# Patient Record
Sex: MALE | Race: WHITE | NOT HISPANIC OR LATINO | Employment: OTHER | URBAN - METROPOLITAN AREA
[De-identification: names, ages, dates, MRNs, and addresses within clinical notes are randomized per-mention and may not be internally consistent; named-entity substitution may affect disease eponyms.]

---

## 2023-03-21 LAB
ALANINE AMINOTRANSFERASE (SGPT) (U/L) IN SER/PLAS: 14 U/L (ref 10–52)
ALBUMIN (G/DL) IN SER/PLAS: 3.6 G/DL (ref 3.4–5)
ALKALINE PHOSPHATASE (U/L) IN SER/PLAS: 85 U/L (ref 33–136)
ANION GAP IN SER/PLAS: 12 MMOL/L (ref 10–20)
ASPARTATE AMINOTRANSFERASE (SGOT) (U/L) IN SER/PLAS: 19 U/L (ref 9–39)
BILIRUBIN TOTAL (MG/DL) IN SER/PLAS: 0.4 MG/DL (ref 0–1.2)
CALCIUM (MG/DL) IN SER/PLAS: 8.8 MG/DL (ref 8.6–10.3)
CARBON DIOXIDE, TOTAL (MMOL/L) IN SER/PLAS: 25 MMOL/L (ref 21–32)
CHLORIDE (MMOL/L) IN SER/PLAS: 104 MMOL/L (ref 98–107)
CREATININE (MG/DL) IN SER/PLAS: 1.16 MG/DL (ref 0.5–1.3)
ERYTHROCYTE DISTRIBUTION WIDTH (RATIO) BY AUTOMATED COUNT: 13.9 % (ref 11.5–14.5)
ERYTHROCYTE MEAN CORPUSCULAR HEMOGLOBIN CONCENTRATION (G/DL) BY AUTOMATED: 32.4 G/DL (ref 32–36)
ERYTHROCYTE MEAN CORPUSCULAR VOLUME (FL) BY AUTOMATED COUNT: 89 FL (ref 80–100)
ERYTHROCYTES (10*6/UL) IN BLOOD BY AUTOMATED COUNT: 4.26 X10E12/L (ref 4.5–5.9)
ESTIMATED AVERAGE GLUCOSE FOR HBA1C: 151 MG/DL
GFR MALE: 68 ML/MIN/1.73M2
GLUCOSE (MG/DL) IN SER/PLAS: 119 MG/DL (ref 74–99)
HEMATOCRIT (%) IN BLOOD BY AUTOMATED COUNT: 38 % (ref 41–52)
HEMOGLOBIN (G/DL) IN BLOOD: 12.3 G/DL (ref 13.5–17.5)
HEMOGLOBIN A1C/HEMOGLOBIN TOTAL IN BLOOD: 6.9 %
LEUKOCYTES (10*3/UL) IN BLOOD BY AUTOMATED COUNT: 5.5 X10E9/L (ref 4.4–11.3)
NRBC (PER 100 WBCS) BY AUTOMATED COUNT: 0 /100 WBC (ref 0–0)
PLATELETS (10*3/UL) IN BLOOD AUTOMATED COUNT: 203 X10E9/L (ref 150–450)
POTASSIUM (MMOL/L) IN SER/PLAS: 4.3 MMOL/L (ref 3.5–5.3)
PROTEIN TOTAL: 6.8 G/DL (ref 6.4–8.2)
SODIUM (MMOL/L) IN SER/PLAS: 137 MMOL/L (ref 136–145)
UREA NITROGEN (MG/DL) IN SER/PLAS: 24 MG/DL (ref 6–23)

## 2023-04-15 LAB
APPEARANCE, URINE: ABNORMAL
BACTERIA, URINE: ABNORMAL /HPF
BILIRUBIN, URINE: NEGATIVE
BLOOD, URINE: ABNORMAL
COLOR, URINE: YELLOW
GLUCOSE, URINE: ABNORMAL MG/DL
KETONES, URINE: NEGATIVE MG/DL
LEUKOCYTE ESTERASE, URINE: ABNORMAL
NITRITE, URINE: POSITIVE
PH, URINE: 7 (ref 5–8)
PROTEIN, URINE: ABNORMAL MG/DL
RBC, URINE: 5 /HPF (ref 0–5)
SPECIFIC GRAVITY, URINE: 1.01 (ref 1–1.03)
SQUAMOUS EPITHELIAL CELLS, URINE: 1 /HPF
UROBILINOGEN, URINE: <2 MG/DL (ref 0–1.9)
WBC CLUMPS, URINE: ABNORMAL /HPF
WBC, URINE: >182 /HPF (ref 0–5)

## 2023-04-17 LAB — URINE CULTURE: ABNORMAL

## 2023-04-27 LAB
ANION GAP IN SER/PLAS: 14 MMOL/L (ref 10–20)
BASOPHILS (10*3/UL) IN BLOOD BY AUTOMATED COUNT: 0.01 X10E9/L (ref 0–0.1)
BASOPHILS/100 LEUKOCYTES IN BLOOD BY AUTOMATED COUNT: 0.1 % (ref 0–2)
CALCIUM (MG/DL) IN SER/PLAS: 9.6 MG/DL (ref 8.6–10.3)
CARBON DIOXIDE, TOTAL (MMOL/L) IN SER/PLAS: 24 MMOL/L (ref 21–32)
CHLORIDE (MMOL/L) IN SER/PLAS: 103 MMOL/L (ref 98–107)
CREATININE (MG/DL) IN SER/PLAS: 1.29 MG/DL (ref 0.5–1.3)
EOSINOPHILS (10*3/UL) IN BLOOD BY AUTOMATED COUNT: 0 X10E9/L (ref 0–0.7)
EOSINOPHILS/100 LEUKOCYTES IN BLOOD BY AUTOMATED COUNT: 0 % (ref 0–6)
ERYTHROCYTE DISTRIBUTION WIDTH (RATIO) BY AUTOMATED COUNT: 14.5 % (ref 11.5–14.5)
ERYTHROCYTE MEAN CORPUSCULAR HEMOGLOBIN CONCENTRATION (G/DL) BY AUTOMATED: 31 G/DL (ref 32–36)
ERYTHROCYTE MEAN CORPUSCULAR VOLUME (FL) BY AUTOMATED COUNT: 89 FL (ref 80–100)
ERYTHROCYTES (10*6/UL) IN BLOOD BY AUTOMATED COUNT: 4.01 X10E12/L (ref 4.5–5.9)
GFR MALE: 60 ML/MIN/1.73M2
GLUCOSE (MG/DL) IN SER/PLAS: 174 MG/DL (ref 74–99)
HEMATOCRIT (%) IN BLOOD BY AUTOMATED COUNT: 35.8 % (ref 41–52)
HEMOGLOBIN (G/DL) IN BLOOD: 11.1 G/DL (ref 13.5–17.5)
IMMATURE GRANULOCYTES/100 LEUKOCYTES IN BLOOD BY AUTOMATED COUNT: 0.6 % (ref 0–0.9)
LEUKOCYTES (10*3/UL) IN BLOOD BY AUTOMATED COUNT: 8.3 X10E9/L (ref 4.4–11.3)
LYMPHOCYTES (10*3/UL) IN BLOOD BY AUTOMATED COUNT: 0.57 X10E9/L (ref 1.2–4.8)
LYMPHOCYTES/100 LEUKOCYTES IN BLOOD BY AUTOMATED COUNT: 6.8 % (ref 13–44)
MONOCYTES (10*3/UL) IN BLOOD BY AUTOMATED COUNT: 0.18 X10E9/L (ref 0.1–1)
MONOCYTES/100 LEUKOCYTES IN BLOOD BY AUTOMATED COUNT: 2.2 % (ref 2–10)
NEUTROPHILS (10*3/UL) IN BLOOD BY AUTOMATED COUNT: 7.52 X10E9/L (ref 1.2–7.7)
NEUTROPHILS/100 LEUKOCYTES IN BLOOD BY AUTOMATED COUNT: 90.3 % (ref 40–80)
NRBC (PER 100 WBCS) BY AUTOMATED COUNT: 0 /100 WBC (ref 0–0)
PLATELETS (10*3/UL) IN BLOOD AUTOMATED COUNT: 227 X10E9/L (ref 150–450)
POTASSIUM (MMOL/L) IN SER/PLAS: 4.9 MMOL/L (ref 3.5–5.3)
SODIUM (MMOL/L) IN SER/PLAS: 136 MMOL/L (ref 136–145)
UREA NITROGEN (MG/DL) IN SER/PLAS: 30 MG/DL (ref 6–23)

## 2023-05-03 LAB
ANION GAP IN SER/PLAS: 11 MMOL/L (ref 10–20)
APPEARANCE, URINE: CLEAR
BILIRUBIN, URINE: NEGATIVE
BLOOD, URINE: ABNORMAL
CALCIUM (MG/DL) IN SER/PLAS: 9.4 MG/DL (ref 8.6–10.3)
CARBON DIOXIDE, TOTAL (MMOL/L) IN SER/PLAS: 28 MMOL/L (ref 21–32)
CHLORIDE (MMOL/L) IN SER/PLAS: 106 MMOL/L (ref 98–107)
COLOR, URINE: YELLOW
CREATININE (MG/DL) IN SER/PLAS: 0.79 MG/DL (ref 0.5–1.3)
ERYTHROCYTE DISTRIBUTION WIDTH (RATIO) BY AUTOMATED COUNT: 13.8 % (ref 11.5–14.5)
ERYTHROCYTE MEAN CORPUSCULAR HEMOGLOBIN CONCENTRATION (G/DL) BY AUTOMATED: 31.7 G/DL (ref 32–36)
ERYTHROCYTE MEAN CORPUSCULAR VOLUME (FL) BY AUTOMATED COUNT: 89 FL (ref 80–100)
ERYTHROCYTES (10*6/UL) IN BLOOD BY AUTOMATED COUNT: 3.9 X10E12/L (ref 4.5–5.9)
GFR MALE: >90 ML/MIN/1.73M2
GLUCOSE (MG/DL) IN SER/PLAS: 48 MG/DL (ref 74–99)
GLUCOSE, URINE: NEGATIVE MG/DL
HEMATOCRIT (%) IN BLOOD BY AUTOMATED COUNT: 34.7 % (ref 41–52)
HEMOGLOBIN (G/DL) IN BLOOD: 11 G/DL (ref 13.5–17.5)
KETONES, URINE: ABNORMAL MG/DL
LEUKOCYTE ESTERASE, URINE: NEGATIVE
LEUKOCYTES (10*3/UL) IN BLOOD BY AUTOMATED COUNT: 4.3 X10E9/L (ref 4.4–11.3)
NITRITE, URINE: NEGATIVE
NRBC (PER 100 WBCS) BY AUTOMATED COUNT: 0 /100 WBC (ref 0–0)
PH, URINE: 6 (ref 5–8)
PLATELETS (10*3/UL) IN BLOOD AUTOMATED COUNT: 241 X10E9/L (ref 150–450)
POTASSIUM (MMOL/L) IN SER/PLAS: 4.2 MMOL/L (ref 3.5–5.3)
PROTEIN, URINE: ABNORMAL MG/DL
RBC, URINE: 2 /HPF (ref 0–5)
SODIUM (MMOL/L) IN SER/PLAS: 141 MMOL/L (ref 136–145)
SPECIFIC GRAVITY, URINE: 1.01 (ref 1–1.03)
SQUAMOUS EPITHELIAL CELLS, URINE: <1 /HPF
UREA NITROGEN (MG/DL) IN SER/PLAS: 20 MG/DL (ref 6–23)
UROBILINOGEN, URINE: 2 MG/DL (ref 0–1.9)
WBC, URINE: <1 /HPF (ref 0–5)

## 2023-05-04 LAB — URINE CULTURE: NORMAL

## 2023-06-16 LAB
ANION GAP IN SER/PLAS: 10 MMOL/L (ref 10–20)
CALCIUM (MG/DL) IN SER/PLAS: 8.7 MG/DL (ref 8.6–10.3)
CARBON DIOXIDE, TOTAL (MMOL/L) IN SER/PLAS: 26 MMOL/L (ref 21–32)
CHLORIDE (MMOL/L) IN SER/PLAS: 106 MMOL/L (ref 98–107)
CREATININE (MG/DL) IN SER/PLAS: 0.86 MG/DL (ref 0.5–1.3)
GFR MALE: >90 ML/MIN/1.73M2
GLUCOSE (MG/DL) IN SER/PLAS: 146 MG/DL (ref 74–99)
POTASSIUM (MMOL/L) IN SER/PLAS: 4.4 MMOL/L (ref 3.5–5.3)
SODIUM (MMOL/L) IN SER/PLAS: 138 MMOL/L (ref 136–145)
UREA NITROGEN (MG/DL) IN SER/PLAS: 15 MG/DL (ref 6–23)

## 2023-06-20 LAB — PREALBUMIN (MG/DL) IN SERUM OR PLASMA: 17.6 MG/DL (ref 18–40)

## 2023-08-28 LAB
ESTIMATED AVERAGE GLUCOSE FOR HBA1C: 151 MG/DL
HEMOGLOBIN A1C/HEMOGLOBIN TOTAL IN BLOOD: 6.9 %

## 2023-10-16 ENCOUNTER — LAB REQUISITION (OUTPATIENT)
Dept: LAB | Facility: HOSPITAL | Age: 68
End: 2023-10-16
Payer: MEDICARE

## 2023-10-16 DIAGNOSIS — I10 ESSENTIAL (PRIMARY) HYPERTENSION: ICD-10-CM

## 2023-10-16 DIAGNOSIS — E11.9 TYPE 2 DIABETES MELLITUS WITHOUT COMPLICATIONS (MULTI): ICD-10-CM

## 2023-10-16 LAB
ALBUMIN SERPL BCP-MCNC: 3.5 G/DL (ref 3.4–5)
ALP SERPL-CCNC: 63 U/L (ref 33–136)
ALT SERPL W P-5'-P-CCNC: 11 U/L (ref 10–52)
ANION GAP SERPL CALC-SCNC: 10 MMOL/L (ref 10–20)
AST SERPL W P-5'-P-CCNC: 15 U/L (ref 9–39)
BILIRUB SERPL-MCNC: 0.3 MG/DL (ref 0–1.2)
BUN SERPL-MCNC: 22 MG/DL (ref 6–23)
CALCIUM SERPL-MCNC: 9 MG/DL (ref 8.6–10.3)
CHLORIDE SERPL-SCNC: 105 MMOL/L (ref 98–107)
CHOLEST SERPL-MCNC: 138 MG/DL (ref 0–199)
CHOLESTEROL/HDL RATIO: 3.6
CO2 SERPL-SCNC: 28 MMOL/L (ref 21–32)
CREAT SERPL-MCNC: 1.21 MG/DL (ref 0.5–1.3)
ERYTHROCYTE [DISTWIDTH] IN BLOOD BY AUTOMATED COUNT: 14.1 % (ref 11.5–14.5)
EST. AVERAGE GLUCOSE BLD GHB EST-MCNC: 151 MG/DL
GFR SERPL CREATININE-BSD FRML MDRD: 65 ML/MIN/1.73M*2
GLUCOSE SERPL-MCNC: 107 MG/DL (ref 74–99)
HBA1C MFR BLD: 6.9 %
HCT VFR BLD AUTO: 36.1 % (ref 41–52)
HDLC SERPL-MCNC: 38.6 MG/DL
HGB BLD-MCNC: 11.4 G/DL (ref 13.5–17.5)
HOLD SPECIMEN: NORMAL
LDLC SERPL CALC-MCNC: 73 MG/DL
MCH RBC QN AUTO: 28.7 PG (ref 26–34)
MCHC RBC AUTO-ENTMCNC: 31.6 G/DL (ref 32–36)
MCV RBC AUTO: 91 FL (ref 80–100)
NON HDL CHOLESTEROL: 99 MG/DL (ref 0–149)
NRBC BLD-RTO: 0 /100 WBCS (ref 0–0)
PLATELET # BLD AUTO: 177 X10*3/UL (ref 150–450)
PMV BLD AUTO: 10.1 FL (ref 7.5–11.5)
POTASSIUM SERPL-SCNC: 4.4 MMOL/L (ref 3.5–5.3)
PROT SERPL-MCNC: 6.2 G/DL (ref 6.4–8.2)
RBC # BLD AUTO: 3.97 X10*6/UL (ref 4.5–5.9)
SODIUM SERPL-SCNC: 139 MMOL/L (ref 136–145)
TRIGL SERPL-MCNC: 132 MG/DL (ref 0–149)
VLDL: 26 MG/DL (ref 0–40)
WBC # BLD AUTO: 5.2 X10*3/UL (ref 4.4–11.3)

## 2023-10-16 PROCEDURE — 80061 LIPID PANEL: CPT | Mod: OUT | Performed by: INTERNAL MEDICINE

## 2023-10-16 PROCEDURE — 80053 COMPREHEN METABOLIC PANEL: CPT | Mod: OUT | Performed by: INTERNAL MEDICINE

## 2023-10-16 PROCEDURE — 85027 COMPLETE CBC AUTOMATED: CPT | Mod: OUT | Performed by: INTERNAL MEDICINE

## 2023-10-16 PROCEDURE — 83036 HEMOGLOBIN GLYCOSYLATED A1C: CPT | Mod: OUT | Performed by: INTERNAL MEDICINE

## 2023-11-15 ENCOUNTER — LAB REQUISITION (OUTPATIENT)
Dept: LAB | Facility: HOSPITAL | Age: 68
End: 2023-11-15
Payer: MEDICARE

## 2023-11-15 DIAGNOSIS — I11.0 HYPERTENSIVE HEART DISEASE WITH HEART FAILURE (MULTI): ICD-10-CM

## 2023-11-15 LAB
ANION GAP SERPL CALC-SCNC: 10 MMOL/L (ref 10–20)
BUN SERPL-MCNC: 20 MG/DL (ref 6–23)
CALCIUM SERPL-MCNC: 8.9 MG/DL (ref 8.6–10.3)
CHLORIDE SERPL-SCNC: 107 MMOL/L (ref 98–107)
CO2 SERPL-SCNC: 27 MMOL/L (ref 21–32)
CREAT SERPL-MCNC: 1.18 MG/DL (ref 0.5–1.3)
ERYTHROCYTE [DISTWIDTH] IN BLOOD BY AUTOMATED COUNT: 13.5 % (ref 11.5–14.5)
GFR SERPL CREATININE-BSD FRML MDRD: 67 ML/MIN/1.73M*2
GLUCOSE SERPL-MCNC: 157 MG/DL (ref 74–99)
HCT VFR BLD AUTO: 36.2 % (ref 41–52)
HGB BLD-MCNC: 11.4 G/DL (ref 13.5–17.5)
MCH RBC QN AUTO: 28.6 PG (ref 26–34)
MCHC RBC AUTO-ENTMCNC: 31.5 G/DL (ref 32–36)
MCV RBC AUTO: 91 FL (ref 80–100)
NRBC BLD-RTO: 0 /100 WBCS (ref 0–0)
PLATELET # BLD AUTO: 196 X10*3/UL (ref 150–450)
POTASSIUM SERPL-SCNC: 4.4 MMOL/L (ref 3.5–5.3)
RBC # BLD AUTO: 3.98 X10*6/UL (ref 4.5–5.9)
SODIUM SERPL-SCNC: 140 MMOL/L (ref 136–145)
WBC # BLD AUTO: 5.4 X10*3/UL (ref 4.4–11.3)

## 2023-11-15 PROCEDURE — 80048 BASIC METABOLIC PNL TOTAL CA: CPT | Mod: OUT | Performed by: INTERNAL MEDICINE

## 2023-11-15 PROCEDURE — 85027 COMPLETE CBC AUTOMATED: CPT | Mod: OUT | Performed by: INTERNAL MEDICINE

## 2023-12-04 ENCOUNTER — LAB REQUISITION (OUTPATIENT)
Dept: LAB | Facility: HOSPITAL | Age: 68
End: 2023-12-04
Payer: MEDICARE

## 2023-12-04 DIAGNOSIS — R19.5 OTHER FECAL ABNORMALITIES: ICD-10-CM

## 2023-12-04 DIAGNOSIS — I50.9 HEART FAILURE, UNSPECIFIED (MULTI): ICD-10-CM

## 2023-12-04 PROCEDURE — 87493 C DIFF AMPLIFIED PROBE: CPT | Mod: OUT,PARLAB | Performed by: INTERNAL MEDICINE

## 2023-12-05 LAB — C DIF TOX TCDA+TCDB STL QL NAA+PROBE: NOT DETECTED

## 2023-12-06 ENCOUNTER — LAB REQUISITION (OUTPATIENT)
Dept: LAB | Facility: HOSPITAL | Age: 68
End: 2023-12-06
Payer: MEDICARE

## 2023-12-06 DIAGNOSIS — I50.20 UNSPECIFIED SYSTOLIC (CONGESTIVE) HEART FAILURE (MULTI): ICD-10-CM

## 2023-12-06 DIAGNOSIS — J96.20 ACUTE AND CHRONIC RESPIRATORY FAILURE, UNSPECIFIED WHETHER WITH HYPOXIA OR HYPERCAPNIA (MULTI): ICD-10-CM

## 2023-12-06 DIAGNOSIS — E11.9 TYPE 2 DIABETES MELLITUS WITHOUT COMPLICATIONS (MULTI): ICD-10-CM

## 2023-12-06 LAB
ANION GAP SERPL CALC-SCNC: 10 MMOL/L (ref 10–20)
BASOPHILS # BLD AUTO: 0.01 X10*3/UL (ref 0–0.1)
BASOPHILS NFR BLD AUTO: 0.2 %
BUN SERPL-MCNC: 18 MG/DL (ref 6–23)
CALCIUM SERPL-MCNC: 8.9 MG/DL (ref 8.6–10.3)
CHLORIDE SERPL-SCNC: 108 MMOL/L (ref 98–107)
CO2 SERPL-SCNC: 26 MMOL/L (ref 21–32)
CREAT SERPL-MCNC: 1.11 MG/DL (ref 0.5–1.3)
EOSINOPHIL # BLD AUTO: 0.18 X10*3/UL (ref 0–0.7)
EOSINOPHIL NFR BLD AUTO: 4.5 %
ERYTHROCYTE [DISTWIDTH] IN BLOOD BY AUTOMATED COUNT: 13.6 % (ref 11.5–14.5)
GFR SERPL CREATININE-BSD FRML MDRD: 72 ML/MIN/1.73M*2
GLUCOSE SERPL-MCNC: 172 MG/DL (ref 74–99)
HCT VFR BLD AUTO: 35.1 % (ref 41–52)
HGB BLD-MCNC: 11.1 G/DL (ref 13.5–17.5)
IMM GRANULOCYTES # BLD AUTO: 0.01 X10*3/UL (ref 0–0.7)
IMM GRANULOCYTES NFR BLD AUTO: 0.2 % (ref 0–0.9)
LYMPHOCYTES # BLD AUTO: 1.38 X10*3/UL (ref 1.2–4.8)
LYMPHOCYTES NFR BLD AUTO: 34.4 %
MCH RBC QN AUTO: 28.8 PG (ref 26–34)
MCHC RBC AUTO-ENTMCNC: 31.6 G/DL (ref 32–36)
MCV RBC AUTO: 91 FL (ref 80–100)
MONOCYTES # BLD AUTO: 0.5 X10*3/UL (ref 0.1–1)
MONOCYTES NFR BLD AUTO: 12.5 %
NEUTROPHILS # BLD AUTO: 1.93 X10*3/UL (ref 1.2–7.7)
NEUTROPHILS NFR BLD AUTO: 48.2 %
NRBC BLD-RTO: 0 /100 WBCS (ref 0–0)
PLATELET # BLD AUTO: 163 X10*3/UL (ref 150–450)
POTASSIUM SERPL-SCNC: 3.9 MMOL/L (ref 3.5–5.3)
RBC # BLD AUTO: 3.86 X10*6/UL (ref 4.5–5.9)
SODIUM SERPL-SCNC: 140 MMOL/L (ref 136–145)
WBC # BLD AUTO: 4 X10*3/UL (ref 4.4–11.3)

## 2023-12-06 PROCEDURE — 80048 BASIC METABOLIC PNL TOTAL CA: CPT | Mod: OUT | Performed by: INTERNAL MEDICINE

## 2023-12-06 PROCEDURE — 85025 COMPLETE CBC W/AUTO DIFF WBC: CPT | Mod: OUT | Performed by: INTERNAL MEDICINE

## 2023-12-20 ENCOUNTER — LAB REQUISITION (OUTPATIENT)
Dept: LAB | Facility: HOSPITAL | Age: 68
End: 2023-12-20
Payer: MEDICARE

## 2023-12-20 DIAGNOSIS — I10 ESSENTIAL (PRIMARY) HYPERTENSION: ICD-10-CM

## 2023-12-20 DIAGNOSIS — E11.9 TYPE 2 DIABETES MELLITUS WITHOUT COMPLICATIONS (MULTI): ICD-10-CM

## 2023-12-20 LAB
ANION GAP SERPL CALC-SCNC: 11 MMOL/L (ref 10–20)
BUN SERPL-MCNC: 21 MG/DL (ref 6–23)
CALCIUM SERPL-MCNC: 8.8 MG/DL (ref 8.6–10.3)
CHLORIDE SERPL-SCNC: 105 MMOL/L (ref 98–107)
CO2 SERPL-SCNC: 27 MMOL/L (ref 21–32)
CREAT SERPL-MCNC: 1.3 MG/DL (ref 0.5–1.3)
GFR SERPL CREATININE-BSD FRML MDRD: 59 ML/MIN/1.73M*2
GLUCOSE SERPL-MCNC: 161 MG/DL (ref 74–99)
POTASSIUM SERPL-SCNC: 4.9 MMOL/L (ref 3.5–5.3)
SODIUM SERPL-SCNC: 138 MMOL/L (ref 136–145)

## 2023-12-20 PROCEDURE — 80048 BASIC METABOLIC PNL TOTAL CA: CPT | Mod: OUT | Performed by: INTERNAL MEDICINE

## 2024-03-14 ENCOUNTER — LAB REQUISITION (OUTPATIENT)
Dept: LAB | Facility: HOSPITAL | Age: 69
End: 2024-03-14
Payer: MEDICARE

## 2024-03-14 DIAGNOSIS — I50.20 UNSPECIFIED SYSTOLIC (CONGESTIVE) HEART FAILURE (MULTI): ICD-10-CM

## 2024-03-14 DIAGNOSIS — I10 ESSENTIAL (PRIMARY) HYPERTENSION: ICD-10-CM

## 2024-03-14 DIAGNOSIS — E11.9 TYPE 2 DIABETES MELLITUS WITHOUT COMPLICATIONS (MULTI): ICD-10-CM

## 2024-03-14 LAB
ALBUMIN SERPL BCP-MCNC: 4.1 G/DL (ref 3.4–5)
ALP SERPL-CCNC: 90 U/L (ref 33–136)
ALT SERPL W P-5'-P-CCNC: 13 U/L (ref 10–52)
ANION GAP SERPL CALC-SCNC: 14 MMOL/L (ref 10–20)
AST SERPL W P-5'-P-CCNC: 15 U/L (ref 9–39)
BILIRUB SERPL-MCNC: 0.4 MG/DL (ref 0–1.2)
BUN SERPL-MCNC: 39 MG/DL (ref 6–23)
CALCIUM SERPL-MCNC: 9.5 MG/DL (ref 8.6–10.3)
CHLORIDE SERPL-SCNC: 103 MMOL/L (ref 98–107)
CO2 SERPL-SCNC: 25 MMOL/L (ref 21–32)
CREAT SERPL-MCNC: 2.61 MG/DL (ref 0.5–1.3)
EGFRCR SERPLBLD CKD-EPI 2021: 26 ML/MIN/1.73M*2
ERYTHROCYTE [DISTWIDTH] IN BLOOD BY AUTOMATED COUNT: 13.5 % (ref 11.5–14.5)
EST. AVERAGE GLUCOSE BLD GHB EST-MCNC: 189 MG/DL
GLUCOSE SERPL-MCNC: 247 MG/DL (ref 74–99)
HBA1C MFR BLD: 8.2 %
HCT VFR BLD AUTO: 40.5 % (ref 41–52)
HGB BLD-MCNC: 12.4 G/DL (ref 13.5–17.5)
MAGNESIUM SERPL-MCNC: 2.32 MG/DL (ref 1.6–2.4)
MCH RBC QN AUTO: 28.6 PG (ref 26–34)
MCHC RBC AUTO-ENTMCNC: 30.6 G/DL (ref 32–36)
MCV RBC AUTO: 93 FL (ref 80–100)
NRBC BLD-RTO: 0 /100 WBCS (ref 0–0)
PLATELET # BLD AUTO: 217 X10*3/UL (ref 150–450)
POTASSIUM SERPL-SCNC: 5.4 MMOL/L (ref 3.5–5.3)
PROT SERPL-MCNC: 7.3 G/DL (ref 6.4–8.2)
RBC # BLD AUTO: 4.34 X10*6/UL (ref 4.5–5.9)
SODIUM SERPL-SCNC: 137 MMOL/L (ref 136–145)
WBC # BLD AUTO: 6.4 X10*3/UL (ref 4.4–11.3)

## 2024-03-14 PROCEDURE — 80053 COMPREHEN METABOLIC PANEL: CPT | Mod: OUT | Performed by: INTERNAL MEDICINE

## 2024-03-14 PROCEDURE — 85027 COMPLETE CBC AUTOMATED: CPT | Mod: OUT | Performed by: INTERNAL MEDICINE

## 2024-03-14 PROCEDURE — 83735 ASSAY OF MAGNESIUM: CPT | Mod: OUT | Performed by: INTERNAL MEDICINE

## 2024-03-14 PROCEDURE — 83036 HEMOGLOBIN GLYCOSYLATED A1C: CPT | Mod: OUT | Performed by: INTERNAL MEDICINE

## 2024-03-24 ENCOUNTER — LAB REQUISITION (OUTPATIENT)
Dept: LAB | Facility: HOSPITAL | Age: 69
End: 2024-03-24
Payer: MEDICARE

## 2024-03-24 DIAGNOSIS — R41.0 DISORIENTATION, UNSPECIFIED: ICD-10-CM

## 2024-03-24 LAB
ANION GAP SERPL CALC-SCNC: 11 MMOL/L (ref 10–20)
APPEARANCE UR: CLEAR
BILIRUB UR STRIP.AUTO-MCNC: NEGATIVE MG/DL
BUN SERPL-MCNC: 24 MG/DL (ref 6–23)
CALCIUM SERPL-MCNC: 9.1 MG/DL (ref 8.6–10.3)
CHLORIDE SERPL-SCNC: 107 MMOL/L (ref 98–107)
CO2 SERPL-SCNC: 26 MMOL/L (ref 21–32)
COLOR UR: YELLOW
CREAT SERPL-MCNC: 1.03 MG/DL (ref 0.5–1.3)
EGFRCR SERPLBLD CKD-EPI 2021: 79 ML/MIN/1.73M*2
ERYTHROCYTE [DISTWIDTH] IN BLOOD BY AUTOMATED COUNT: 13.2 % (ref 11.5–14.5)
GLUCOSE SERPL-MCNC: 139 MG/DL (ref 74–99)
GLUCOSE UR STRIP.AUTO-MCNC: ABNORMAL MG/DL
HCT VFR BLD AUTO: 38.6 % (ref 41–52)
HGB BLD-MCNC: 12.5 G/DL (ref 13.5–17.5)
KETONES UR STRIP.AUTO-MCNC: NEGATIVE MG/DL
LEUKOCYTE ESTERASE UR QL STRIP.AUTO: NEGATIVE
MCH RBC QN AUTO: 29.3 PG (ref 26–34)
MCHC RBC AUTO-ENTMCNC: 32.4 G/DL (ref 32–36)
MCV RBC AUTO: 90 FL (ref 80–100)
NITRITE UR QL STRIP.AUTO: NEGATIVE
NRBC BLD-RTO: 0 /100 WBCS (ref 0–0)
PH UR STRIP.AUTO: 5 [PH]
PLATELET # BLD AUTO: 170 X10*3/UL (ref 150–450)
POTASSIUM SERPL-SCNC: 4.7 MMOL/L (ref 3.5–5.3)
PROT UR STRIP.AUTO-MCNC: NEGATIVE MG/DL
RBC # BLD AUTO: 4.27 X10*6/UL (ref 4.5–5.9)
RBC # UR STRIP.AUTO: NEGATIVE /UL
SODIUM SERPL-SCNC: 139 MMOL/L (ref 136–145)
SP GR UR STRIP.AUTO: 1.02
UROBILINOGEN UR STRIP.AUTO-MCNC: 2 MG/DL
WBC # BLD AUTO: 4.2 X10*3/UL (ref 4.4–11.3)

## 2024-03-24 PROCEDURE — 81003 URINALYSIS AUTO W/O SCOPE: CPT | Mod: OUT | Performed by: INTERNAL MEDICINE

## 2024-03-24 PROCEDURE — 85027 COMPLETE CBC AUTOMATED: CPT | Mod: OUT | Performed by: INTERNAL MEDICINE

## 2024-03-24 PROCEDURE — 87086 URINE CULTURE/COLONY COUNT: CPT | Mod: OUT,PARLAB | Performed by: INTERNAL MEDICINE

## 2024-03-24 PROCEDURE — 80048 BASIC METABOLIC PNL TOTAL CA: CPT | Mod: OUT | Performed by: INTERNAL MEDICINE

## 2024-03-26 LAB — BACTERIA UR CULT: ABNORMAL

## 2024-04-01 ENCOUNTER — LAB REQUISITION (OUTPATIENT)
Dept: LAB | Facility: HOSPITAL | Age: 69
End: 2024-04-01
Payer: MEDICARE

## 2024-04-01 DIAGNOSIS — I11.0 HYPERTENSIVE HEART DISEASE WITH HEART FAILURE (MULTI): ICD-10-CM

## 2024-04-01 LAB
BASOPHILS # BLD AUTO: 0.02 X10*3/UL (ref 0–0.1)
BASOPHILS NFR BLD AUTO: 0.4 %
EOSINOPHIL # BLD AUTO: 0.07 X10*3/UL (ref 0–0.7)
EOSINOPHIL NFR BLD AUTO: 1.3 %
ERYTHROCYTE [DISTWIDTH] IN BLOOD BY AUTOMATED COUNT: 13.5 % (ref 11.5–14.5)
HCT VFR BLD AUTO: 37.6 % (ref 41–52)
HGB BLD-MCNC: 11.6 G/DL (ref 13.5–17.5)
IMM GRANULOCYTES # BLD AUTO: 0.01 X10*3/UL (ref 0–0.7)
IMM GRANULOCYTES NFR BLD AUTO: 0.2 % (ref 0–0.9)
LYMPHOCYTES # BLD AUTO: 1.54 X10*3/UL (ref 1.2–4.8)
LYMPHOCYTES NFR BLD AUTO: 29.3 %
MCH RBC QN AUTO: 28.7 PG (ref 26–34)
MCHC RBC AUTO-ENTMCNC: 30.9 G/DL (ref 32–36)
MCV RBC AUTO: 93 FL (ref 80–100)
MONOCYTES # BLD AUTO: 0.44 X10*3/UL (ref 0.1–1)
MONOCYTES NFR BLD AUTO: 8.4 %
NEUTROPHILS # BLD AUTO: 3.18 X10*3/UL (ref 1.2–7.7)
NEUTROPHILS NFR BLD AUTO: 60.4 %
NRBC BLD-RTO: 0 /100 WBCS (ref 0–0)
PLATELET # BLD AUTO: 202 X10*3/UL (ref 150–450)
RBC # BLD AUTO: 4.04 X10*6/UL (ref 4.5–5.9)
WBC # BLD AUTO: 5.3 X10*3/UL (ref 4.4–11.3)

## 2024-04-01 PROCEDURE — 85025 COMPLETE CBC W/AUTO DIFF WBC: CPT | Mod: OUT | Performed by: INTERNAL MEDICINE

## 2024-04-02 ENCOUNTER — LAB REQUISITION (OUTPATIENT)
Dept: LAB | Facility: HOSPITAL | Age: 69
End: 2024-04-02
Payer: MEDICARE

## 2024-04-02 DIAGNOSIS — E11.9 TYPE 2 DIABETES MELLITUS WITHOUT COMPLICATIONS (MULTI): ICD-10-CM

## 2024-04-02 DIAGNOSIS — D64.9 ANEMIA, UNSPECIFIED: ICD-10-CM

## 2024-04-02 LAB
ERYTHROCYTE [DISTWIDTH] IN BLOOD BY AUTOMATED COUNT: 13.5 % (ref 11.5–14.5)
HCT VFR BLD AUTO: 38.4 % (ref 41–52)
HGB BLD-MCNC: 12 G/DL (ref 13.5–17.5)
MCH RBC QN AUTO: 29.3 PG (ref 26–34)
MCHC RBC AUTO-ENTMCNC: 31.3 G/DL (ref 32–36)
MCV RBC AUTO: 94 FL (ref 80–100)
NRBC BLD-RTO: 0 /100 WBCS (ref 0–0)
PLATELET # BLD AUTO: 160 X10*3/UL (ref 150–450)
RBC # BLD AUTO: 4.1 X10*6/UL (ref 4.5–5.9)
WBC # BLD AUTO: 4.4 X10*3/UL (ref 4.4–11.3)

## 2024-04-02 PROCEDURE — 85027 COMPLETE CBC AUTOMATED: CPT | Mod: OUT | Performed by: INTERNAL MEDICINE

## 2024-05-23 ENCOUNTER — LAB REQUISITION (OUTPATIENT)
Dept: LAB | Facility: HOSPITAL | Age: 69
End: 2024-05-23
Payer: MEDICARE

## 2024-05-23 DIAGNOSIS — D64.9 ANEMIA, UNSPECIFIED: ICD-10-CM

## 2024-05-23 DIAGNOSIS — E11.9 TYPE 2 DIABETES MELLITUS WITHOUT COMPLICATIONS (MULTI): ICD-10-CM

## 2024-05-23 LAB
ANION GAP SERPL CALC-SCNC: 14 MMOL/L (ref 10–20)
BUN SERPL-MCNC: 21 MG/DL (ref 6–23)
CALCIUM SERPL-MCNC: 9.6 MG/DL (ref 8.6–10.3)
CHLORIDE SERPL-SCNC: 104 MMOL/L (ref 98–107)
CO2 SERPL-SCNC: 25 MMOL/L (ref 21–32)
CREAT SERPL-MCNC: 1.01 MG/DL (ref 0.5–1.3)
EGFRCR SERPLBLD CKD-EPI 2021: 81 ML/MIN/1.73M*2
ERYTHROCYTE [DISTWIDTH] IN BLOOD BY AUTOMATED COUNT: 13.2 % (ref 11.5–14.5)
GLUCOSE SERPL-MCNC: 122 MG/DL (ref 74–99)
HCT VFR BLD AUTO: 41.7 % (ref 41–52)
HGB BLD-MCNC: 13.7 G/DL (ref 13.5–17.5)
MCH RBC QN AUTO: 29.5 PG (ref 26–34)
MCHC RBC AUTO-ENTMCNC: 32.9 G/DL (ref 32–36)
MCV RBC AUTO: 90 FL (ref 80–100)
NRBC BLD-RTO: 0 /100 WBCS (ref 0–0)
PLATELET # BLD AUTO: 226 X10*3/UL (ref 150–450)
POTASSIUM SERPL-SCNC: 4.5 MMOL/L (ref 3.5–5.3)
RBC # BLD AUTO: 4.64 X10*6/UL (ref 4.5–5.9)
SODIUM SERPL-SCNC: 138 MMOL/L (ref 136–145)
WBC # BLD AUTO: 5.8 X10*3/UL (ref 4.4–11.3)

## 2024-05-23 PROCEDURE — 85027 COMPLETE CBC AUTOMATED: CPT | Mod: OUT | Performed by: INTERNAL MEDICINE

## 2024-05-23 PROCEDURE — 82374 ASSAY BLOOD CARBON DIOXIDE: CPT | Mod: OUT | Performed by: INTERNAL MEDICINE

## 2024-06-06 ENCOUNTER — LAB REQUISITION (OUTPATIENT)
Dept: LAB | Facility: HOSPITAL | Age: 69
End: 2024-06-06
Payer: MEDICARE

## 2024-06-06 DIAGNOSIS — R11.10 VOMITING, UNSPECIFIED: ICD-10-CM

## 2024-06-06 LAB
ANION GAP SERPL CALC-SCNC: 20 MMOL/L (ref 10–20)
BUN SERPL-MCNC: 29 MG/DL (ref 6–23)
CALCIUM SERPL-MCNC: 8.9 MG/DL (ref 8.6–10.3)
CHLORIDE SERPL-SCNC: 98 MMOL/L (ref 98–107)
CO2 SERPL-SCNC: 20 MMOL/L (ref 21–32)
CREAT SERPL-MCNC: 1.7 MG/DL (ref 0.5–1.3)
EGFRCR SERPLBLD CKD-EPI 2021: 43 ML/MIN/1.73M*2
ERYTHROCYTE [DISTWIDTH] IN BLOOD BY AUTOMATED COUNT: 13.3 % (ref 11.5–14.5)
GLUCOSE SERPL-MCNC: 318 MG/DL (ref 74–99)
HCT VFR BLD AUTO: 41.9 % (ref 41–52)
HGB BLD-MCNC: 13.6 G/DL (ref 13.5–17.5)
MCH RBC QN AUTO: 29.8 PG (ref 26–34)
MCHC RBC AUTO-ENTMCNC: 32.5 G/DL (ref 32–36)
MCV RBC AUTO: 92 FL (ref 80–100)
NRBC BLD-RTO: 0 /100 WBCS (ref 0–0)
PLATELET # BLD AUTO: 151 X10*3/UL (ref 150–450)
POTASSIUM SERPL-SCNC: 5.6 MMOL/L (ref 3.5–5.3)
RBC # BLD AUTO: 4.56 X10*6/UL (ref 4.5–5.9)
SODIUM SERPL-SCNC: 132 MMOL/L (ref 136–145)
WBC # BLD AUTO: 2.6 X10*3/UL (ref 4.4–11.3)

## 2024-06-06 PROCEDURE — 82374 ASSAY BLOOD CARBON DIOXIDE: CPT | Mod: OUT | Performed by: INTERNAL MEDICINE

## 2024-06-06 PROCEDURE — 85027 COMPLETE CBC AUTOMATED: CPT | Mod: OUT | Performed by: INTERNAL MEDICINE

## 2024-06-06 PROCEDURE — 80048 BASIC METABOLIC PNL TOTAL CA: CPT | Mod: OUT | Performed by: INTERNAL MEDICINE

## 2024-06-08 ENCOUNTER — LAB REQUISITION (OUTPATIENT)
Dept: LAB | Facility: HOSPITAL | Age: 69
End: 2024-06-08
Payer: MEDICARE

## 2024-06-08 DIAGNOSIS — R94.4 ABNORMAL RESULTS OF KIDNEY FUNCTION STUDIES: ICD-10-CM

## 2024-06-08 DIAGNOSIS — D72.819 DECREASED WHITE BLOOD CELL COUNT, UNSPECIFIED: ICD-10-CM

## 2024-06-08 LAB
ANION GAP SERPL CALC-SCNC: 13 MMOL/L (ref 10–20)
BASOPHILS # BLD AUTO: 0.01 X10*3/UL (ref 0–0.1)
BASOPHILS NFR BLD AUTO: 0.1 %
BUN SERPL-MCNC: 45 MG/DL (ref 6–23)
CALCIUM SERPL-MCNC: 9.1 MG/DL (ref 8.6–10.3)
CHLORIDE SERPL-SCNC: 97 MMOL/L (ref 98–107)
CO2 SERPL-SCNC: 27 MMOL/L (ref 21–32)
CREAT SERPL-MCNC: 2.57 MG/DL (ref 0.5–1.3)
EGFRCR SERPLBLD CKD-EPI 2021: 26 ML/MIN/1.73M*2
EOSINOPHIL # BLD AUTO: 0.04 X10*3/UL (ref 0–0.7)
EOSINOPHIL NFR BLD AUTO: 0.5 %
ERYTHROCYTE [DISTWIDTH] IN BLOOD BY AUTOMATED COUNT: 13.4 % (ref 11.5–14.5)
GLUCOSE SERPL-MCNC: 201 MG/DL (ref 74–99)
HCT VFR BLD AUTO: 35.9 % (ref 41–52)
HGB BLD-MCNC: 11.9 G/DL (ref 13.5–17.5)
IMM GRANULOCYTES # BLD AUTO: 0.02 X10*3/UL (ref 0–0.7)
IMM GRANULOCYTES NFR BLD AUTO: 0.3 % (ref 0–0.9)
LYMPHOCYTES # BLD AUTO: 0.38 X10*3/UL (ref 1.2–4.8)
LYMPHOCYTES NFR BLD AUTO: 5.1 %
MCH RBC QN AUTO: 29.8 PG (ref 26–34)
MCHC RBC AUTO-ENTMCNC: 33.1 G/DL (ref 32–36)
MCV RBC AUTO: 90 FL (ref 80–100)
MONOCYTES # BLD AUTO: 0.4 X10*3/UL (ref 0.1–1)
MONOCYTES NFR BLD AUTO: 5.4 %
NEUTROPHILS # BLD AUTO: 6.54 X10*3/UL (ref 1.2–7.7)
NEUTROPHILS NFR BLD AUTO: 88.6 %
NRBC BLD-RTO: 0 /100 WBCS (ref 0–0)
PLATELET # BLD AUTO: 155 X10*3/UL (ref 150–450)
POTASSIUM SERPL-SCNC: 3.9 MMOL/L (ref 3.5–5.3)
RBC # BLD AUTO: 3.99 X10*6/UL (ref 4.5–5.9)
SODIUM SERPL-SCNC: 133 MMOL/L (ref 136–145)
WBC # BLD AUTO: 7.4 X10*3/UL (ref 4.4–11.3)

## 2024-06-08 PROCEDURE — 80048 BASIC METABOLIC PNL TOTAL CA: CPT | Mod: OUT | Performed by: INTERNAL MEDICINE

## 2024-06-08 PROCEDURE — 85025 COMPLETE CBC W/AUTO DIFF WBC: CPT | Mod: OUT | Performed by: INTERNAL MEDICINE

## 2024-06-10 ENCOUNTER — LAB REQUISITION (OUTPATIENT)
Dept: LAB | Facility: HOSPITAL | Age: 69
End: 2024-06-10
Payer: MEDICARE

## 2024-06-10 DIAGNOSIS — E11.9 TYPE 2 DIABETES MELLITUS WITHOUT COMPLICATIONS (MULTI): ICD-10-CM

## 2024-06-10 LAB
ANION GAP SERPL CALC-SCNC: 13 MMOL/L (ref 10–20)
BASOPHILS # BLD AUTO: 0.01 X10*3/UL (ref 0–0.1)
BASOPHILS NFR BLD AUTO: 0.2 %
BUN SERPL-MCNC: 59 MG/DL (ref 6–23)
CALCIUM SERPL-MCNC: 8.5 MG/DL (ref 8.6–10.3)
CHLORIDE SERPL-SCNC: 100 MMOL/L (ref 98–107)
CO2 SERPL-SCNC: 26 MMOL/L (ref 21–32)
CREAT SERPL-MCNC: 2.66 MG/DL (ref 0.5–1.3)
EGFRCR SERPLBLD CKD-EPI 2021: 25 ML/MIN/1.73M*2
EOSINOPHIL # BLD AUTO: 0.05 X10*3/UL (ref 0–0.7)
EOSINOPHIL NFR BLD AUTO: 1.2 %
ERYTHROCYTE [DISTWIDTH] IN BLOOD BY AUTOMATED COUNT: 14 % (ref 11.5–14.5)
GLUCOSE SERPL-MCNC: 87 MG/DL (ref 74–99)
HCT VFR BLD AUTO: 34.7 % (ref 41–52)
HGB BLD-MCNC: 11.4 G/DL (ref 13.5–17.5)
IMM GRANULOCYTES # BLD AUTO: 0.01 X10*3/UL (ref 0–0.7)
IMM GRANULOCYTES NFR BLD AUTO: 0.2 % (ref 0–0.9)
LYMPHOCYTES # BLD AUTO: 0.66 X10*3/UL (ref 1.2–4.8)
LYMPHOCYTES NFR BLD AUTO: 15.8 %
MCH RBC QN AUTO: 29.2 PG (ref 26–34)
MCHC RBC AUTO-ENTMCNC: 32.9 G/DL (ref 32–36)
MCV RBC AUTO: 89 FL (ref 80–100)
MONOCYTES # BLD AUTO: 0.32 X10*3/UL (ref 0.1–1)
MONOCYTES NFR BLD AUTO: 7.7 %
NEUTROPHILS # BLD AUTO: 3.12 X10*3/UL (ref 1.2–7.7)
NEUTROPHILS NFR BLD AUTO: 74.9 %
NRBC BLD-RTO: 0 /100 WBCS (ref 0–0)
PLATELET # BLD AUTO: 149 X10*3/UL (ref 150–450)
POTASSIUM SERPL-SCNC: 3.7 MMOL/L (ref 3.5–5.3)
RBC # BLD AUTO: 3.9 X10*6/UL (ref 4.5–5.9)
SODIUM SERPL-SCNC: 135 MMOL/L (ref 136–145)
WBC # BLD AUTO: 4.2 X10*3/UL (ref 4.4–11.3)

## 2024-06-10 PROCEDURE — 82435 ASSAY OF BLOOD CHLORIDE: CPT | Mod: OUT | Performed by: INTERNAL MEDICINE

## 2024-06-10 PROCEDURE — 85025 COMPLETE CBC W/AUTO DIFF WBC: CPT | Mod: OUT | Performed by: INTERNAL MEDICINE

## 2024-06-11 ENCOUNTER — LAB REQUISITION (OUTPATIENT)
Dept: LAB | Facility: HOSPITAL | Age: 69
End: 2024-06-11
Payer: MEDICARE

## 2024-06-11 DIAGNOSIS — E86.0 DEHYDRATION: ICD-10-CM

## 2024-06-11 LAB
ANION GAP SERPL CALC-SCNC: 13 MMOL/L (ref 10–20)
BASOPHILS # BLD AUTO: 0.02 X10*3/UL (ref 0–0.1)
BASOPHILS NFR BLD AUTO: 0.5 %
BUN SERPL-MCNC: 60 MG/DL (ref 6–23)
CALCIUM SERPL-MCNC: 8.3 MG/DL (ref 8.6–10.3)
CHLORIDE SERPL-SCNC: 101 MMOL/L (ref 98–107)
CO2 SERPL-SCNC: 26 MMOL/L (ref 21–32)
CREAT SERPL-MCNC: 2.41 MG/DL (ref 0.5–1.3)
EGFRCR SERPLBLD CKD-EPI 2021: 28 ML/MIN/1.73M*2
EOSINOPHIL # BLD AUTO: 0.05 X10*3/UL (ref 0–0.7)
EOSINOPHIL NFR BLD AUTO: 1.1 %
ERYTHROCYTE [DISTWIDTH] IN BLOOD BY AUTOMATED COUNT: 14 % (ref 11.5–14.5)
GLUCOSE SERPL-MCNC: 87 MG/DL (ref 74–99)
HCT VFR BLD AUTO: 34.7 % (ref 41–52)
HGB BLD-MCNC: 11.4 G/DL (ref 13.5–17.5)
IMM GRANULOCYTES # BLD AUTO: 0.05 X10*3/UL (ref 0–0.7)
IMM GRANULOCYTES NFR BLD AUTO: 1.1 % (ref 0–0.9)
LYMPHOCYTES # BLD AUTO: 0.89 X10*3/UL (ref 1.2–4.8)
LYMPHOCYTES NFR BLD AUTO: 20 %
MCH RBC QN AUTO: 29.1 PG (ref 26–34)
MCHC RBC AUTO-ENTMCNC: 32.9 G/DL (ref 32–36)
MCV RBC AUTO: 89 FL (ref 80–100)
MONOCYTES # BLD AUTO: 0.49 X10*3/UL (ref 0.1–1)
MONOCYTES NFR BLD AUTO: 11 %
NEUTROPHILS # BLD AUTO: 2.94 X10*3/UL (ref 1.2–7.7)
NEUTROPHILS NFR BLD AUTO: 66.3 %
NRBC BLD-RTO: 0 /100 WBCS (ref 0–0)
PLATELET # BLD AUTO: 156 X10*3/UL (ref 150–450)
POTASSIUM SERPL-SCNC: 3.7 MMOL/L (ref 3.5–5.3)
RBC # BLD AUTO: 3.92 X10*6/UL (ref 4.5–5.9)
SODIUM SERPL-SCNC: 136 MMOL/L (ref 136–145)
WBC # BLD AUTO: 4.4 X10*3/UL (ref 4.4–11.3)

## 2024-06-11 PROCEDURE — 80048 BASIC METABOLIC PNL TOTAL CA: CPT | Mod: OUT | Performed by: INTERNAL MEDICINE

## 2024-06-11 PROCEDURE — 85025 COMPLETE CBC W/AUTO DIFF WBC: CPT | Mod: OUT | Performed by: INTERNAL MEDICINE

## 2024-06-12 ENCOUNTER — LAB REQUISITION (OUTPATIENT)
Dept: LAB | Facility: HOSPITAL | Age: 69
End: 2024-06-12
Payer: MEDICARE

## 2024-06-12 DIAGNOSIS — I50.20 UNSPECIFIED SYSTOLIC (CONGESTIVE) HEART FAILURE (MULTI): ICD-10-CM

## 2024-06-12 DIAGNOSIS — R41.0 DISORIENTATION, UNSPECIFIED: ICD-10-CM

## 2024-06-12 LAB
AMPHETAMINES UR QL SCN: ABNORMAL
ANION GAP SERPL CALC-SCNC: 14 MMOL/L (ref 10–20)
APPEARANCE UR: ABNORMAL
BACTERIA #/AREA URNS AUTO: ABNORMAL /HPF
BARBITURATES UR QL SCN: ABNORMAL
BASOPHILS # BLD AUTO: 0.01 X10*3/UL (ref 0–0.1)
BASOPHILS NFR BLD AUTO: 0.2 %
BENZODIAZ UR QL SCN: ABNORMAL
BILIRUB UR STRIP.AUTO-MCNC: NEGATIVE MG/DL
BNP SERPL-MCNC: 122 PG/ML (ref 0–99)
BUN SERPL-MCNC: 57 MG/DL (ref 6–23)
BZE UR QL SCN: ABNORMAL
CALCIUM SERPL-MCNC: 8.7 MG/DL (ref 8.6–10.3)
CANNABINOIDS UR QL SCN: ABNORMAL
CHLORIDE SERPL-SCNC: 104 MMOL/L (ref 98–107)
CO2 SERPL-SCNC: 25 MMOL/L (ref 21–32)
COLOR UR: YELLOW
CREAT SERPL-MCNC: 2.04 MG/DL (ref 0.5–1.3)
EGFRCR SERPLBLD CKD-EPI 2021: 35 ML/MIN/1.73M*2
EOSINOPHIL # BLD AUTO: 0.07 X10*3/UL (ref 0–0.7)
EOSINOPHIL NFR BLD AUTO: 1.3 %
ERYTHROCYTE [DISTWIDTH] IN BLOOD BY AUTOMATED COUNT: 14.2 % (ref 11.5–14.5)
FENTANYL+NORFENTANYL UR QL SCN: ABNORMAL
GLUCOSE SERPL-MCNC: 55 MG/DL (ref 74–99)
GLUCOSE UR STRIP.AUTO-MCNC: ABNORMAL MG/DL
HCT VFR BLD AUTO: 36 % (ref 41–52)
HGB BLD-MCNC: 11.9 G/DL (ref 13.5–17.5)
IMM GRANULOCYTES # BLD AUTO: 0.04 X10*3/UL (ref 0–0.7)
IMM GRANULOCYTES NFR BLD AUTO: 0.8 % (ref 0–0.9)
KETONES UR STRIP.AUTO-MCNC: NEGATIVE MG/DL
LEUKOCYTE ESTERASE UR QL STRIP.AUTO: ABNORMAL
LYMPHOCYTES # BLD AUTO: 0.68 X10*3/UL (ref 1.2–4.8)
LYMPHOCYTES NFR BLD AUTO: 12.8 %
MCH RBC QN AUTO: 29.2 PG (ref 26–34)
MCHC RBC AUTO-ENTMCNC: 33.1 G/DL (ref 32–36)
MCV RBC AUTO: 89 FL (ref 80–100)
METHADONE UR QL SCN: ABNORMAL
MONOCYTES # BLD AUTO: 0.31 X10*3/UL (ref 0.1–1)
MONOCYTES NFR BLD AUTO: 5.8 %
NEUTROPHILS # BLD AUTO: 4.21 X10*3/UL (ref 1.2–7.7)
NEUTROPHILS NFR BLD AUTO: 79.1 %
NITRITE UR QL STRIP.AUTO: NEGATIVE
NRBC BLD-RTO: 0 /100 WBCS (ref 0–0)
OPIATES UR QL SCN: ABNORMAL
OXYCODONE+OXYMORPHONE UR QL SCN: ABNORMAL
PCP UR QL SCN: ABNORMAL
PH UR STRIP.AUTO: 5 [PH]
PLATELET # BLD AUTO: 170 X10*3/UL (ref 150–450)
POTASSIUM SERPL-SCNC: 3.7 MMOL/L (ref 3.5–5.3)
PROT UR STRIP.AUTO-MCNC: NEGATIVE MG/DL
RBC # BLD AUTO: 4.07 X10*6/UL (ref 4.5–5.9)
RBC # UR STRIP.AUTO: ABNORMAL /UL
RBC #/AREA URNS AUTO: ABNORMAL /HPF
SODIUM SERPL-SCNC: 139 MMOL/L (ref 136–145)
SP GR UR STRIP.AUTO: 1.01
UROBILINOGEN UR STRIP.AUTO-MCNC: NORMAL MG/DL
WBC # BLD AUTO: 5.3 X10*3/UL (ref 4.4–11.3)
WBC #/AREA URNS AUTO: ABNORMAL /HPF
WBC CLUMPS #/AREA URNS AUTO: ABNORMAL /HPF

## 2024-06-12 PROCEDURE — 81001 URINALYSIS AUTO W/SCOPE: CPT | Mod: OUT | Performed by: INTERNAL MEDICINE

## 2024-06-12 PROCEDURE — 80307 DRUG TEST PRSMV CHEM ANLYZR: CPT | Mod: OUT | Performed by: INTERNAL MEDICINE

## 2024-06-12 PROCEDURE — 87086 URINE CULTURE/COLONY COUNT: CPT | Mod: OUT,PARLAB | Performed by: INTERNAL MEDICINE

## 2024-06-12 PROCEDURE — 83880 ASSAY OF NATRIURETIC PEPTIDE: CPT | Mod: OUT | Performed by: INTERNAL MEDICINE

## 2024-06-12 PROCEDURE — 82374 ASSAY BLOOD CARBON DIOXIDE: CPT | Mod: OUT | Performed by: INTERNAL MEDICINE

## 2024-06-12 PROCEDURE — 80349 CANNABINOIDS NATURAL: CPT | Mod: OUT | Performed by: INTERNAL MEDICINE

## 2024-06-12 PROCEDURE — 85025 COMPLETE CBC W/AUTO DIFF WBC: CPT | Mod: OUT | Performed by: INTERNAL MEDICINE

## 2024-06-13 ENCOUNTER — LAB REQUISITION (OUTPATIENT)
Dept: LAB | Facility: HOSPITAL | Age: 69
End: 2024-06-13

## 2024-06-13 DIAGNOSIS — E11.9 TYPE 2 DIABETES MELLITUS WITHOUT COMPLICATIONS (MULTI): ICD-10-CM

## 2024-06-13 LAB
ANION GAP SERPL CALC-SCNC: 14 MMOL/L (ref 10–20)
BUN SERPL-MCNC: 58 MG/DL (ref 6–23)
CALCIUM SERPL-MCNC: 8.3 MG/DL (ref 8.6–10.3)
CHLORIDE SERPL-SCNC: 99 MMOL/L (ref 98–107)
CO2 SERPL-SCNC: 24 MMOL/L (ref 21–32)
CREAT SERPL-MCNC: 2.08 MG/DL (ref 0.5–1.3)
EGFRCR SERPLBLD CKD-EPI 2021: 34 ML/MIN/1.73M*2
GLUCOSE SERPL-MCNC: 141 MG/DL (ref 74–99)
POTASSIUM SERPL-SCNC: 3.8 MMOL/L (ref 3.5–5.3)
SODIUM SERPL-SCNC: 133 MMOL/L (ref 136–145)

## 2024-06-13 PROCEDURE — 84520 ASSAY OF UREA NITROGEN: CPT | Mod: OUT | Performed by: INTERNAL MEDICINE

## 2024-06-14 ENCOUNTER — LAB REQUISITION (OUTPATIENT)
Dept: LAB | Facility: HOSPITAL | Age: 69
End: 2024-06-14
Payer: MEDICARE

## 2024-06-14 DIAGNOSIS — E11.9 TYPE 2 DIABETES MELLITUS WITHOUT COMPLICATIONS (MULTI): ICD-10-CM

## 2024-06-14 LAB
ANION GAP SERPL CALC-SCNC: 14 MMOL/L (ref 10–20)
BACTERIA UR CULT: ABNORMAL
BUN SERPL-MCNC: 59 MG/DL (ref 6–23)
CALCIUM SERPL-MCNC: 8.4 MG/DL (ref 8.6–10.3)
CHLORIDE SERPL-SCNC: 99 MMOL/L (ref 98–107)
CO2 SERPL-SCNC: 24 MMOL/L (ref 21–32)
CREAT SERPL-MCNC: 2.32 MG/DL (ref 0.5–1.3)
EGFRCR SERPLBLD CKD-EPI 2021: 30 ML/MIN/1.73M*2
GLUCOSE SERPL-MCNC: 257 MG/DL (ref 74–99)
POTASSIUM SERPL-SCNC: 4 MMOL/L (ref 3.5–5.3)
SODIUM SERPL-SCNC: 133 MMOL/L (ref 136–145)

## 2024-06-14 PROCEDURE — 82374 ASSAY BLOOD CARBON DIOXIDE: CPT | Mod: OUT | Performed by: INTERNAL MEDICINE

## 2024-06-16 LAB — CARBOXYTHC UR-MCNC: 206 NG/ML

## 2024-06-17 ENCOUNTER — LAB REQUISITION (OUTPATIENT)
Dept: LAB | Facility: HOSPITAL | Age: 69
End: 2024-06-17
Payer: MEDICARE

## 2024-06-17 DIAGNOSIS — E11.9 TYPE 2 DIABETES MELLITUS WITHOUT COMPLICATIONS (MULTI): ICD-10-CM

## 2024-06-17 LAB
ANION GAP SERPL CALC-SCNC: 14 MMOL/L (ref 10–20)
BUN SERPL-MCNC: 65 MG/DL (ref 6–23)
CALCIUM SERPL-MCNC: 8.2 MG/DL (ref 8.6–10.3)
CHLORIDE SERPL-SCNC: 100 MMOL/L (ref 98–107)
CO2 SERPL-SCNC: 24 MMOL/L (ref 21–32)
CREAT SERPL-MCNC: 2.5 MG/DL (ref 0.5–1.3)
EGFRCR SERPLBLD CKD-EPI 2021: 27 ML/MIN/1.73M*2
GLUCOSE SERPL-MCNC: 137 MG/DL (ref 74–99)
POTASSIUM SERPL-SCNC: 4.1 MMOL/L (ref 3.5–5.3)
SODIUM SERPL-SCNC: 134 MMOL/L (ref 136–145)

## 2024-06-17 PROCEDURE — 80048 BASIC METABOLIC PNL TOTAL CA: CPT | Mod: OUT | Performed by: INTERNAL MEDICINE

## 2024-06-18 ENCOUNTER — LAB REQUISITION (OUTPATIENT)
Dept: LAB | Facility: HOSPITAL | Age: 69
End: 2024-06-18
Payer: MEDICARE

## 2024-06-18 DIAGNOSIS — R79.9 ABNORMAL FINDING OF BLOOD CHEMISTRY, UNSPECIFIED: ICD-10-CM

## 2024-06-18 LAB
ANION GAP SERPL CALC-SCNC: 15 MMOL/L (ref 10–20)
BUN SERPL-MCNC: 62 MG/DL (ref 6–23)
CALCIUM SERPL-MCNC: 8.2 MG/DL (ref 8.6–10.3)
CHLORIDE SERPL-SCNC: 100 MMOL/L (ref 98–107)
CO2 SERPL-SCNC: 22 MMOL/L (ref 21–32)
CREAT SERPL-MCNC: 2.3 MG/DL (ref 0.5–1.3)
EGFRCR SERPLBLD CKD-EPI 2021: 30 ML/MIN/1.73M*2
GLUCOSE SERPL-MCNC: 59 MG/DL (ref 74–99)
POTASSIUM SERPL-SCNC: 4.6 MMOL/L (ref 3.5–5.3)
SODIUM SERPL-SCNC: 132 MMOL/L (ref 136–145)

## 2024-06-18 PROCEDURE — 80048 BASIC METABOLIC PNL TOTAL CA: CPT | Mod: OUT | Performed by: INTERNAL MEDICINE

## 2024-06-21 ENCOUNTER — LAB REQUISITION (OUTPATIENT)
Dept: LAB | Facility: HOSPITAL | Age: 69
End: 2024-06-21
Payer: MEDICARE

## 2024-06-21 DIAGNOSIS — I50.20 UNSPECIFIED SYSTOLIC (CONGESTIVE) HEART FAILURE (MULTI): ICD-10-CM

## 2024-06-21 DIAGNOSIS — I11.0 HYPERTENSIVE HEART DISEASE WITH HEART FAILURE (MULTI): ICD-10-CM

## 2024-06-21 DIAGNOSIS — I10 ESSENTIAL (PRIMARY) HYPERTENSION: ICD-10-CM

## 2024-06-21 LAB
ANION GAP SERPL CALC-SCNC: 12 MMOL/L (ref 10–20)
BNP SERPL-MCNC: 254 PG/ML (ref 0–99)
BUN SERPL-MCNC: 57 MG/DL (ref 6–23)
CALCIUM SERPL-MCNC: 8.5 MG/DL (ref 8.6–10.3)
CHLORIDE SERPL-SCNC: 103 MMOL/L (ref 98–107)
CO2 SERPL-SCNC: 23 MMOL/L (ref 21–32)
CREAT SERPL-MCNC: 2.29 MG/DL (ref 0.5–1.3)
EGFRCR SERPLBLD CKD-EPI 2021: 30 ML/MIN/1.73M*2
GLUCOSE SERPL-MCNC: 152 MG/DL (ref 74–99)
POTASSIUM SERPL-SCNC: 5.4 MMOL/L (ref 3.5–5.3)
SODIUM SERPL-SCNC: 133 MMOL/L (ref 136–145)

## 2024-06-21 PROCEDURE — 80048 BASIC METABOLIC PNL TOTAL CA: CPT | Mod: OUT | Performed by: INTERNAL MEDICINE

## 2024-06-21 PROCEDURE — 83880 ASSAY OF NATRIURETIC PEPTIDE: CPT | Mod: OUT | Performed by: INTERNAL MEDICINE

## 2024-06-24 ENCOUNTER — LAB REQUISITION (OUTPATIENT)
Dept: LAB | Facility: HOSPITAL | Age: 69
End: 2024-06-24
Payer: MEDICARE

## 2024-06-24 DIAGNOSIS — E78.5 HYPERLIPIDEMIA, UNSPECIFIED: ICD-10-CM

## 2024-06-24 DIAGNOSIS — I50.9 HEART FAILURE, UNSPECIFIED (MULTI): ICD-10-CM

## 2024-06-24 LAB
ANION GAP SERPL CALC-SCNC: 12 MMOL/L (ref 10–20)
BUN SERPL-MCNC: 46 MG/DL (ref 6–23)
CALCIUM SERPL-MCNC: 8.7 MG/DL (ref 8.6–10.3)
CHLORIDE SERPL-SCNC: 103 MMOL/L (ref 98–107)
CO2 SERPL-SCNC: 25 MMOL/L (ref 21–32)
CREAT SERPL-MCNC: 2.04 MG/DL (ref 0.5–1.3)
EGFRCR SERPLBLD CKD-EPI 2021: 35 ML/MIN/1.73M*2
GLUCOSE SERPL-MCNC: 103 MG/DL (ref 74–99)
POTASSIUM SERPL-SCNC: 5.6 MMOL/L (ref 3.5–5.3)
SODIUM SERPL-SCNC: 134 MMOL/L (ref 136–145)

## 2024-06-24 PROCEDURE — 80048 BASIC METABOLIC PNL TOTAL CA: CPT | Mod: OUT | Performed by: INTERNAL MEDICINE

## 2024-06-24 PROCEDURE — 82374 ASSAY BLOOD CARBON DIOXIDE: CPT | Mod: OUT | Performed by: INTERNAL MEDICINE

## 2024-06-26 ENCOUNTER — LAB REQUISITION (OUTPATIENT)
Dept: LAB | Facility: HOSPITAL | Age: 69
End: 2024-06-26
Payer: MEDICARE

## 2024-06-26 DIAGNOSIS — E11.9 TYPE 2 DIABETES MELLITUS WITHOUT COMPLICATIONS (MULTI): ICD-10-CM

## 2024-06-26 DIAGNOSIS — N18.9 CHRONIC KIDNEY DISEASE, UNSPECIFIED: ICD-10-CM

## 2024-06-26 LAB
ANION GAP SERPL CALC-SCNC: 13 MMOL/L (ref 10–20)
BUN SERPL-MCNC: 55 MG/DL (ref 6–23)
CALCIUM SERPL-MCNC: 8.4 MG/DL (ref 8.6–10.3)
CHLORIDE SERPL-SCNC: 103 MMOL/L (ref 98–107)
CO2 SERPL-SCNC: 22 MMOL/L (ref 21–32)
CREAT SERPL-MCNC: 2.49 MG/DL (ref 0.5–1.3)
EGFRCR SERPLBLD CKD-EPI 2021: 27 ML/MIN/1.73M*2
GLUCOSE SERPL-MCNC: 161 MG/DL (ref 74–99)
POTASSIUM SERPL-SCNC: 5.1 MMOL/L (ref 3.5–5.3)
SODIUM SERPL-SCNC: 133 MMOL/L (ref 136–145)

## 2024-06-26 PROCEDURE — 80048 BASIC METABOLIC PNL TOTAL CA: CPT | Mod: OUT | Performed by: INTERNAL MEDICINE

## 2024-06-28 ENCOUNTER — LAB REQUISITION (OUTPATIENT)
Dept: LAB | Facility: HOSPITAL | Age: 69
End: 2024-06-28
Payer: MEDICARE

## 2024-06-28 DIAGNOSIS — E11.9 TYPE 2 DIABETES MELLITUS WITHOUT COMPLICATIONS (MULTI): ICD-10-CM

## 2024-06-28 LAB
ANION GAP SERPL CALC-SCNC: 12 MMOL/L (ref 10–20)
BUN SERPL-MCNC: 53 MG/DL (ref 6–23)
CALCIUM SERPL-MCNC: 8.4 MG/DL (ref 8.6–10.3)
CHLORIDE SERPL-SCNC: 107 MMOL/L (ref 98–107)
CO2 SERPL-SCNC: 23 MMOL/L (ref 21–32)
CREAT SERPL-MCNC: 2.09 MG/DL (ref 0.5–1.3)
EGFRCR SERPLBLD CKD-EPI 2021: 34 ML/MIN/1.73M*2
GLUCOSE SERPL-MCNC: 168 MG/DL (ref 74–99)
POTASSIUM SERPL-SCNC: 5.5 MMOL/L (ref 3.5–5.3)
SODIUM SERPL-SCNC: 136 MMOL/L (ref 136–145)

## 2024-06-28 PROCEDURE — 80048 BASIC METABOLIC PNL TOTAL CA: CPT | Mod: OUT | Performed by: INTERNAL MEDICINE

## 2024-07-01 ENCOUNTER — LAB REQUISITION (OUTPATIENT)
Dept: LAB | Facility: HOSPITAL | Age: 69
End: 2024-07-01
Payer: MEDICARE

## 2024-07-01 DIAGNOSIS — R79.9 ABNORMAL FINDING OF BLOOD CHEMISTRY, UNSPECIFIED: ICD-10-CM

## 2024-07-01 LAB
ANION GAP SERPL CALC-SCNC: 10 MMOL/L (ref 10–20)
BUN SERPL-MCNC: 26 MG/DL (ref 6–23)
CALCIUM SERPL-MCNC: 8.8 MG/DL (ref 8.6–10.3)
CHLORIDE SERPL-SCNC: 108 MMOL/L (ref 98–107)
CO2 SERPL-SCNC: 24 MMOL/L (ref 21–32)
CREAT SERPL-MCNC: 1.22 MG/DL (ref 0.5–1.3)
EGFRCR SERPLBLD CKD-EPI 2021: 64 ML/MIN/1.73M*2
GLUCOSE SERPL-MCNC: 123 MG/DL (ref 74–99)
POTASSIUM SERPL-SCNC: 4.7 MMOL/L (ref 3.5–5.3)
SODIUM SERPL-SCNC: 137 MMOL/L (ref 136–145)

## 2024-07-01 PROCEDURE — 80048 BASIC METABOLIC PNL TOTAL CA: CPT | Mod: OUT | Performed by: INTERNAL MEDICINE

## 2024-07-01 PROCEDURE — 36415 COLL VENOUS BLD VENIPUNCTURE: CPT | Performed by: INTERNAL MEDICINE

## 2024-07-08 ENCOUNTER — LAB REQUISITION (OUTPATIENT)
Dept: LAB | Facility: HOSPITAL | Age: 69
End: 2024-07-08
Payer: MEDICARE

## 2024-07-08 DIAGNOSIS — E11.9 TYPE 2 DIABETES MELLITUS WITHOUT COMPLICATIONS (MULTI): ICD-10-CM

## 2024-07-08 LAB
ANION GAP SERPL CALC-SCNC: 12 MMOL/L (ref 10–20)
BUN SERPL-MCNC: 27 MG/DL (ref 6–23)
CALCIUM SERPL-MCNC: 8.7 MG/DL (ref 8.6–10.3)
CHLORIDE SERPL-SCNC: 108 MMOL/L (ref 98–107)
CO2 SERPL-SCNC: 23 MMOL/L (ref 21–32)
CREAT SERPL-MCNC: 1.37 MG/DL (ref 0.5–1.3)
EGFRCR SERPLBLD CKD-EPI 2021: 56 ML/MIN/1.73M*2
GLUCOSE SERPL-MCNC: 205 MG/DL (ref 74–99)
HOLD SPECIMEN: NORMAL
POTASSIUM SERPL-SCNC: 4.7 MMOL/L (ref 3.5–5.3)
SODIUM SERPL-SCNC: 138 MMOL/L (ref 136–145)

## 2024-07-08 PROCEDURE — 36415 COLL VENOUS BLD VENIPUNCTURE: CPT | Performed by: INTERNAL MEDICINE

## 2024-07-08 PROCEDURE — 80048 BASIC METABOLIC PNL TOTAL CA: CPT | Mod: OUT | Performed by: INTERNAL MEDICINE

## 2024-07-16 ENCOUNTER — LAB REQUISITION (OUTPATIENT)
Dept: LAB | Facility: HOSPITAL | Age: 69
End: 2024-07-16
Payer: MEDICARE

## 2024-07-16 DIAGNOSIS — I10 ESSENTIAL (PRIMARY) HYPERTENSION: ICD-10-CM

## 2024-07-16 LAB
ANION GAP SERPL CALC-SCNC: 11 MMOL/L (ref 10–20)
BUN SERPL-MCNC: 23 MG/DL (ref 6–23)
CALCIUM SERPL-MCNC: 8.8 MG/DL (ref 8.6–10.3)
CHLORIDE SERPL-SCNC: 107 MMOL/L (ref 98–107)
CO2 SERPL-SCNC: 23 MMOL/L (ref 21–32)
CREAT SERPL-MCNC: 1.24 MG/DL (ref 0.5–1.3)
EGFRCR SERPLBLD CKD-EPI 2021: 63 ML/MIN/1.73M*2
GLUCOSE SERPL-MCNC: 133 MG/DL (ref 74–99)
POTASSIUM SERPL-SCNC: 4.4 MMOL/L (ref 3.5–5.3)
SODIUM SERPL-SCNC: 137 MMOL/L (ref 136–145)

## 2024-07-16 PROCEDURE — 36415 COLL VENOUS BLD VENIPUNCTURE: CPT | Performed by: INTERNAL MEDICINE

## 2024-07-16 PROCEDURE — 80048 BASIC METABOLIC PNL TOTAL CA: CPT | Mod: OUT | Performed by: INTERNAL MEDICINE

## 2024-08-06 ENCOUNTER — LAB REQUISITION (OUTPATIENT)
Dept: LAB | Facility: HOSPITAL | Age: 69
End: 2024-08-06
Payer: MEDICARE

## 2024-08-06 DIAGNOSIS — E11.9 TYPE 2 DIABETES MELLITUS WITHOUT COMPLICATIONS (MULTI): ICD-10-CM

## 2024-08-06 DIAGNOSIS — I10 ESSENTIAL (PRIMARY) HYPERTENSION: ICD-10-CM

## 2024-08-06 LAB
ANION GAP SERPL CALC-SCNC: 12 MMOL/L (ref 10–20)
BUN SERPL-MCNC: 27 MG/DL (ref 6–23)
CALCIUM SERPL-MCNC: 8.9 MG/DL (ref 8.6–10.3)
CHLORIDE SERPL-SCNC: 106 MMOL/L (ref 98–107)
CO2 SERPL-SCNC: 25 MMOL/L (ref 21–32)
CREAT SERPL-MCNC: 1.57 MG/DL (ref 0.5–1.3)
EGFRCR SERPLBLD CKD-EPI 2021: 47 ML/MIN/1.73M*2
EST. AVERAGE GLUCOSE BLD GHB EST-MCNC: 120 MG/DL
GLUCOSE SERPL-MCNC: 225 MG/DL (ref 74–99)
HBA1C MFR BLD: 5.8 %
POTASSIUM SERPL-SCNC: 5.4 MMOL/L (ref 3.5–5.3)
SODIUM SERPL-SCNC: 138 MMOL/L (ref 136–145)

## 2024-08-06 PROCEDURE — 80048 BASIC METABOLIC PNL TOTAL CA: CPT | Mod: OUT | Performed by: INTERNAL MEDICINE

## 2024-08-06 PROCEDURE — 83036 HEMOGLOBIN GLYCOSYLATED A1C: CPT | Mod: OUT | Performed by: INTERNAL MEDICINE

## 2024-08-06 PROCEDURE — 36415 COLL VENOUS BLD VENIPUNCTURE: CPT | Performed by: INTERNAL MEDICINE

## 2024-08-07 ENCOUNTER — LAB REQUISITION (OUTPATIENT)
Dept: LAB | Facility: HOSPITAL | Age: 69
End: 2024-08-07
Payer: MEDICARE

## 2024-08-07 DIAGNOSIS — I50.9 HEART FAILURE, UNSPECIFIED (MULTI): ICD-10-CM

## 2024-08-07 LAB
ANION GAP SERPL CALC-SCNC: 13 MMOL/L (ref 10–20)
BUN SERPL-MCNC: 30 MG/DL (ref 6–23)
CALCIUM SERPL-MCNC: 9.3 MG/DL (ref 8.6–10.3)
CHLORIDE SERPL-SCNC: 105 MMOL/L (ref 98–107)
CO2 SERPL-SCNC: 24 MMOL/L (ref 21–32)
CREAT SERPL-MCNC: 1.58 MG/DL (ref 0.5–1.3)
EGFRCR SERPLBLD CKD-EPI 2021: 47 ML/MIN/1.73M*2
GLUCOSE SERPL-MCNC: 244 MG/DL (ref 74–99)
POTASSIUM SERPL-SCNC: 5.7 MMOL/L (ref 3.5–5.3)
SODIUM SERPL-SCNC: 136 MMOL/L (ref 136–145)

## 2024-08-07 PROCEDURE — 80048 BASIC METABOLIC PNL TOTAL CA: CPT | Mod: OUT | Performed by: INTERNAL MEDICINE

## 2024-08-07 PROCEDURE — 36415 COLL VENOUS BLD VENIPUNCTURE: CPT | Performed by: INTERNAL MEDICINE

## 2024-08-09 ENCOUNTER — LAB REQUISITION (OUTPATIENT)
Dept: LAB | Facility: HOSPITAL | Age: 69
End: 2024-08-09
Payer: MEDICARE

## 2024-08-09 DIAGNOSIS — E11.9 TYPE 2 DIABETES MELLITUS WITHOUT COMPLICATIONS (MULTI): ICD-10-CM

## 2024-08-09 DIAGNOSIS — E87.6 HYPOKALEMIA: ICD-10-CM

## 2024-08-09 LAB — POTASSIUM SERPL-SCNC: 5.1 MMOL/L (ref 3.5–5.3)

## 2024-08-09 PROCEDURE — 84132 ASSAY OF SERUM POTASSIUM: CPT | Mod: OUT | Performed by: INTERNAL MEDICINE

## 2024-08-09 PROCEDURE — 36415 COLL VENOUS BLD VENIPUNCTURE: CPT | Performed by: INTERNAL MEDICINE

## 2024-11-30 ENCOUNTER — APPOINTMENT (OUTPATIENT)
Dept: RADIOLOGY | Facility: HOSPITAL | Age: 69
End: 2024-11-30
Payer: MEDICARE

## 2024-11-30 ENCOUNTER — HOSPITAL ENCOUNTER (EMERGENCY)
Facility: HOSPITAL | Age: 69
Discharge: HOME | End: 2024-11-30
Payer: MEDICARE

## 2024-11-30 VITALS
HEART RATE: 64 BPM | TEMPERATURE: 98.1 F | SYSTOLIC BLOOD PRESSURE: 144 MMHG | DIASTOLIC BLOOD PRESSURE: 65 MMHG | OXYGEN SATURATION: 93 %

## 2024-11-30 DIAGNOSIS — S89.92XA INJURY OF LEFT LOWER EXTREMITY, INITIAL ENCOUNTER: Primary | ICD-10-CM

## 2024-11-30 DIAGNOSIS — S81.812A LACERATION OF LEFT LOWER EXTREMITY, INITIAL ENCOUNTER: ICD-10-CM

## 2024-11-30 PROCEDURE — 2500000005 HC RX 250 GENERAL PHARMACY W/O HCPCS: Performed by: PHYSICIAN ASSISTANT

## 2024-11-30 PROCEDURE — 73590 X-RAY EXAM OF LOWER LEG: CPT | Mod: LT

## 2024-11-30 PROCEDURE — 73560 X-RAY EXAM OF KNEE 1 OR 2: CPT | Mod: LEFT SIDE | Performed by: RADIOLOGY

## 2024-11-30 PROCEDURE — 73560 X-RAY EXAM OF KNEE 1 OR 2: CPT | Mod: LT

## 2024-11-30 PROCEDURE — 90471 IMMUNIZATION ADMIN: CPT | Performed by: PHYSICIAN ASSISTANT

## 2024-11-30 PROCEDURE — 2500000004 HC RX 250 GENERAL PHARMACY W/ HCPCS (ALT 636 FOR OP/ED): Performed by: PHYSICIAN ASSISTANT

## 2024-11-30 PROCEDURE — 12035 INTMD RPR S/A/T/EXT 12.6-20: CPT | Performed by: PHYSICIAN ASSISTANT

## 2024-11-30 PROCEDURE — 90715 TDAP VACCINE 7 YRS/> IM: CPT | Performed by: PHYSICIAN ASSISTANT

## 2024-11-30 PROCEDURE — 2500000001 HC RX 250 WO HCPCS SELF ADMINISTERED DRUGS (ALT 637 FOR MEDICARE OP): Performed by: PHYSICIAN ASSISTANT

## 2024-11-30 PROCEDURE — 73590 X-RAY EXAM OF LOWER LEG: CPT | Mod: LEFT SIDE | Performed by: RADIOLOGY

## 2024-11-30 PROCEDURE — 99284 EMERGENCY DEPT VISIT MOD MDM: CPT | Mod: 25

## 2024-11-30 RX ORDER — ACETAMINOPHEN 325 MG/1
975 TABLET ORAL ONCE
Status: COMPLETED | OUTPATIENT
Start: 2024-11-30 | End: 2024-11-30

## 2024-11-30 RX ORDER — BACITRACIN ZINC 500 UNIT/G
OINTMENT IN PACKET (EA) TOPICAL ONCE
Status: COMPLETED | OUTPATIENT
Start: 2024-11-30 | End: 2024-11-30

## 2024-11-30 RX ORDER — DOXYCYCLINE 100 MG/1
100 TABLET ORAL 2 TIMES DAILY
Qty: 20 TABLET | Refills: 0 | Status: SHIPPED | OUTPATIENT
Start: 2024-11-30 | End: 2024-12-10

## 2024-11-30 RX ORDER — DOXYCYCLINE HYCLATE 50 MG/1
100 CAPSULE, GELATIN COATED ORAL ONCE
Status: COMPLETED | OUTPATIENT
Start: 2024-11-30 | End: 2024-11-30

## 2024-11-30 RX ADMIN — ACETAMINOPHEN 975 MG: 325 TABLET, FILM COATED ORAL at 03:17

## 2024-11-30 RX ADMIN — TETANUS TOXOID, REDUCED DIPHTHERIA TOXOID AND ACELLULAR PERTUSSIS VACCINE, ADSORBED 0.5 ML: 5; 2.5; 8; 8; 2.5 SUSPENSION INTRAMUSCULAR at 03:17

## 2024-11-30 RX ADMIN — DOXYCYCLINE HYCLATE 100 MG: 50 CAPSULE ORAL at 03:17

## 2024-11-30 RX ADMIN — BACITRACIN ZINC 1 APPLICATION: 500 OINTMENT TOPICAL at 03:17

## 2024-11-30 ASSESSMENT — LIFESTYLE VARIABLES
HAVE YOU EVER FELT YOU SHOULD CUT DOWN ON YOUR DRINKING: NO
HAVE PEOPLE ANNOYED YOU BY CRITICIZING YOUR DRINKING: NO
EVER HAD A DRINK FIRST THING IN THE MORNING TO STEADY YOUR NERVES TO GET RID OF A HANGOVER: NO
TOTAL SCORE: 0
EVER FELT BAD OR GUILTY ABOUT YOUR DRINKING: NO

## 2024-11-30 ASSESSMENT — PAIN SCALES - GENERAL: PAINLEVEL_OUTOF10: 5 - MODERATE PAIN

## 2024-11-30 ASSESSMENT — COLUMBIA-SUICIDE SEVERITY RATING SCALE - C-SSRS
2. HAVE YOU ACTUALLY HAD ANY THOUGHTS OF KILLING YOURSELF?: NO
1. IN THE PAST MONTH, HAVE YOU WISHED YOU WERE DEAD OR WISHED YOU COULD GO TO SLEEP AND NOT WAKE UP?: NO
6. HAVE YOU EVER DONE ANYTHING, STARTED TO DO ANYTHING, OR PREPARED TO DO ANYTHING TO END YOUR LIFE?: NO

## 2024-11-30 ASSESSMENT — PAIN - FUNCTIONAL ASSESSMENT: PAIN_FUNCTIONAL_ASSESSMENT: 0-10

## 2024-11-30 NOTE — ED TRIAGE NOTES
Pt brought in via ems stating pt is from a group home, who happened to had fallen off his wheelchair and sustained a 12 cm laceration on his left foot. Per pt, he denied hitting his head after the fall. Per pt, he must have slept while on his chair, and may had dozed off before the incident happened. Pt is A/O x3. But forgetful.

## 2024-11-30 NOTE — ED PROVIDER NOTES
HPI   No chief complaint on file.      69-year-old obese male with a significant past medical history of type 2 diabetes mellitus with diabetic polyneuropathy, hypertension, morbid obesity, lymphedema, hyperlipidemia, depression, right below the knee amputee and chronic kidney disease who presents today complaining of an injury to his left leg.  I spoke with the patient along with nursing staff at the care facility who reported that the patient fell asleep in his electronic wheelchair and proceeded to fall forward.  Patient then struck his leg against the wheelchair causing a large laceration.  Nursing staff reported that his last tetanus shot has been greater than the past 5 years.  This was a witnessed fall.  Patient states mild discomfort.  He denies any head injury or loss of consciousness.  The patient is not anticoagulated.            Patient History   Past Medical History:   Diagnosis Date    Other specified health status     No pertinent past medical history     Past Surgical History:   Procedure Laterality Date    OTHER SURGICAL HISTORY  07/07/2020    Lower extremity amputation below knee     No family history on file.  Social History     Tobacco Use    Smoking status: Not on file    Smokeless tobacco: Not on file   Substance Use Topics    Alcohol use: Not on file    Drug use: Not on file       Physical Exam   ED Triage Vitals [11/30/24 0155]   Temp Heart Rate Resp BP   -- 65 -- 131/63      Pulse Ox Temp src Heart Rate Source Patient Position   95 % -- -- --      BP Location FiO2 (%)     -- --       Physical Exam  Vitals and nursing note reviewed.   Constitutional:       General: He is not in acute distress.     Appearance: Normal appearance. He is normal weight. He is not ill-appearing, toxic-appearing or diaphoretic.   HENT:      Head: Normocephalic and atraumatic.      Nose: Nose normal.      Mouth/Throat:      Mouth: Mucous membranes are moist.   Cardiovascular:      Rate and Rhythm: Normal rate and  regular rhythm.      Pulses: Normal pulses.   Pulmonary:      Effort: Pulmonary effort is normal. No respiratory distress.      Breath sounds: Normal breath sounds.   Musculoskeletal:      Comments: Right BKA.  No direct bony tenderness on exam of the left lower extremity.  Patient has a moderate amount of edema throughout the left leg.  Compartments remain soft   Skin:     General: Skin is warm and dry.      Capillary Refill: Capillary refill takes less than 2 seconds.      Comments: There is a large curvilinear gaping laceration measuring around 16 cm in total length.  Laceration itself does not appear to have involved the muscle fascia.  There is no surrounding erythema and warmth   Neurological:      General: No focal deficit present.      Mental Status: He is alert and oriented to person, place, and time.   Psychiatric:         Mood and Affect: Mood normal.         Behavior: Behavior normal.         Thought Content: Thought content normal.         Judgment: Judgment normal.         ED Course & MDM   ED Course as of 11/30/24 0311   Sat Nov 30, 2024   0308 IMPRESSION:  Laceration along the anterior/lateral aspect of the proximal calf  soft tissues and diffuse subcutaneous edema. No radiopaque foreign  bodies. No acute osseous abnormality.  Moderate tricompartmental osteoarthrosis.   [DS]      ED Course User Index  [DS] Al Galindo PA-C         Diagnoses as of 11/30/24 0311   Injury of left lower extremity, initial encounter   Laceration of left lower extremity, initial encounter                 No data recorded                                 Medical Decision Making  Spoke with the nursing staff regarding the history present illness.  The patient suffered a large laceration after falling asleep in his electronic wheelchair earlier this morning.  Exam the patient had a large laceration to the left leg with no evidence of surrounding erythema.  Patient remains neurovascularly intact with soft compartments.   X-rays were obtained and revealed no evidence of retained foreign body or bony involvement.  Status status was updated.  His wound was cleaned and thoroughly irrigated.  Patient was started on doxycycline given the complexity of the laceration.  Total of 20 sutures were placed approximating the wound to the best of my ability.  Bacitracin was applied along with Xeroform and a compressive wrap.  Wound care referral was placed.  Patient will be instructed to follow-up with his primary care physician for reassessment in the next 2 to 3 days.  Recommended being observant for signs and symptoms of infection.  Encouraged elevation        Procedure  Laceration Repair    Performed by: Al Galindo PA-C  Authorized by: Al Galindo PA-C    Consent:     Consent obtained:  Verbal    Consent given by:  Patient    Risks, benefits, and alternatives were discussed: yes      Risks discussed:  Infection, pain, retained foreign body, tendon damage, poor cosmetic result, need for additional repair, nerve damage, poor wound healing and vascular damage  Universal protocol:     Patient identity confirmed:  Verbally with patient and arm band  Anesthesia:     Anesthesia method:  Local infiltration    Local anesthetic:  Lidocaine 1% WITH epi  Laceration details:     Location:  Leg    Leg location:  L knee    Length (cm):  16  Pre-procedure details:     Preparation:  Imaging obtained to evaluate for foreign bodies  Exploration:     Limited defect created (wound extended): no      Hemostasis achieved with:  Direct pressure    Imaging obtained: x-ray      Imaging outcome: foreign body not noted      Wound exploration: wound explored through full range of motion and entire depth of wound visualized      Wound extent: no fascia violation noted, no foreign bodies/material noted and no underlying fracture noted      Contaminated: no    Treatment:     Area cleansed with:  Chlorhexidine    Amount of cleaning:  Extensive    Irrigation solution:   Sterile saline    Irrigation method:  Syringe    Visualized foreign bodies/material removed: no    Skin repair:     Repair method:  Sutures    Suture size:  4-0    Suture material:  Prolene    Suture technique:  Simple interrupted    Number of sutures:  20  Approximation:     Approximation:  Close  Repair type:     Repair type:  Intermediate  Post-procedure details:     Dressing:  Antibiotic ointment and non-adherent dressing    Procedure completion:  Tolerated       Al Galindo PA-C  11/30/24 0316

## 2024-11-30 NOTE — DISCHARGE INSTRUCTIONS
He will need to follow-up with the [place] wound care clinic in the next few days for reassessment of your wound.  He will need to have your sutures removed in the next 7 to 10 days. Take your antibiotic as prescribed.

## 2025-04-21 ENCOUNTER — HOSPITAL ENCOUNTER (EMERGENCY)
Facility: HOSPITAL | Age: 70
Discharge: HOME | End: 2025-04-21
Payer: MEDICARE

## 2025-04-21 VITALS
DIASTOLIC BLOOD PRESSURE: 60 MMHG | BODY MASS INDEX: 44.1 KG/M2 | TEMPERATURE: 97.7 F | HEIGHT: 71 IN | RESPIRATION RATE: 18 BRPM | OXYGEN SATURATION: 100 % | SYSTOLIC BLOOD PRESSURE: 131 MMHG | HEART RATE: 62 BPM | WEIGHT: 315 LBS

## 2025-04-21 DIAGNOSIS — S81.812A LACERATION OF LEFT LOWER EXTREMITY, INITIAL ENCOUNTER: Primary | ICD-10-CM

## 2025-04-21 PROCEDURE — 13122 CMPLX RPR S/A/L ADDL 5 CM/>: CPT | Performed by: PHYSICIAN ASSISTANT

## 2025-04-21 PROCEDURE — 99283 EMERGENCY DEPT VISIT LOW MDM: CPT

## 2025-04-21 PROCEDURE — 13121 CMPLX RPR S/A/L 2.6-7.5 CM: CPT

## 2025-04-21 PROCEDURE — 13122 CMPLX RPR S/A/L ADDL 5 CM/>: CPT

## 2025-04-21 ASSESSMENT — COLUMBIA-SUICIDE SEVERITY RATING SCALE - C-SSRS
2. HAVE YOU ACTUALLY HAD ANY THOUGHTS OF KILLING YOURSELF?: NO
6. HAVE YOU EVER DONE ANYTHING, STARTED TO DO ANYTHING, OR PREPARED TO DO ANYTHING TO END YOUR LIFE?: NO
1. IN THE PAST MONTH, HAVE YOU WISHED YOU WERE DEAD OR WISHED YOU COULD GO TO SLEEP AND NOT WAKE UP?: NO

## 2025-04-21 ASSESSMENT — PAIN DESCRIPTION - PAIN TYPE: TYPE: ACUTE PAIN

## 2025-04-21 ASSESSMENT — PAIN - FUNCTIONAL ASSESSMENT: PAIN_FUNCTIONAL_ASSESSMENT: 0-10

## 2025-04-21 ASSESSMENT — PAIN SCALES - GENERAL: PAINLEVEL_OUTOF10: 8

## 2025-04-21 NOTE — ED TRIAGE NOTES
Patient presents to ED with complaints of laceration to left lower leg. Patient states he fell asleep in his wheelchair and fell out injuring his leg. Patient states he did not hit his head or lose consciousness. Patient denies use of blood thinners. Patient complaining of 8/10 pain. Bleeding controlled in triage.

## 2025-04-21 NOTE — ED PROVIDER NOTES
" Limitations to History: none  External Records Reviewed  Independent Historians: self  Social determinants affecting care: none    HPI  Lawrence Hardwick is a 69 y.o. male who presents to the emergency department for assessment of a laceration to his left leg.  He reports that he accidentally lacerated his left leg on a sharp piece of his wheelchair.  He reports that he currently lives at a skilled nursing facility and they could not get it to stop bleeding.  He does report mild pain.  Reports that this happened about a year ago and had to come to the ER to have stitches placed as well.  He is unsure if he is up-to-date on tetanus immunization.  He denies any other injuries.     [facility]  Medical History[1] reviewed by myself.    Meds  No current outpatient medications    Allergies  RX Allergies[2] reviewed by myself.    SHx  Social History[3] reviewed by myself.      ------------------------------------------------------------------------------------------------------------------------------------------    /66 (BP Location: Right arm, Patient Position: Sitting)   Pulse 62   Temp 36.5 °C (97.7 °F) (Temporal)   Resp 18   Ht 1.803 m (5' 11\")   Wt (!) 159 kg (350 lb)   SpO2 96%   BMI 48.82 kg/m²     Physical Exam  Vitals and nursing note reviewed.   Constitutional:       General: He is not in acute distress.     Appearance: Normal appearance. He is not ill-appearing or toxic-appearing.   HENT:      Head: Atraumatic.      Nose: Nose normal.      Mouth/Throat:      Mouth: Mucous membranes are moist.   Eyes:      Extraocular Movements: Extraocular movements intact.      Conjunctiva/sclera: Conjunctivae normal.   Cardiovascular:      Rate and Rhythm: Normal rate and regular rhythm.   Pulmonary:      Effort: Pulmonary effort is normal.      Breath sounds: Normal breath sounds.   Musculoskeletal:      Cervical back: Neck supple.   Skin:     General: Skin is warm and dry.      Comments: 10cm Curved shaped large " laceration to the left lateral calf. Small amount of bleeding noted. Mild tenderness.   Neurological:      Mental Status: He is alert and oriented to person, place, and time.   Psychiatric:         Mood and Affect: Mood normal.          ------------------------------------------------------------------------------------------------------------------------------------------  Labs  Labs Reviewed - No data to display     Imaging  No orders to display        ED Course  Diagnoses as of 04/21/25 0246   Laceration of left lower extremity, initial encounter        Medical Decision Making: He did not appear ill or toxic.  Vital signs reviewed and stable.  He does have a large laceration to the left leg.  Sutures were placed.  He tolerated well.  He is to have the stitches removed in 10 to 14 days.  I did review that he had his tetanus immunization updated last year.  He was discharged back to skilled nursing facility with wound care instructions.    Diagnosis: Leg laceration  Plan: Discharge       [1]   Past Medical History:  Diagnosis Date    Other specified health status     No pertinent past medical history   [2] No Known Allergies  [3]         Isac Carranza PA-C  04/21/25 0247

## 2025-04-21 NOTE — ED PROCEDURE NOTE
Procedure  Laceration Repair    Performed by: Isac Carranza PA-C  Authorized by: Isac Carranza PA-C    Consent:     Consent obtained:  Verbal    Consent given by:  Patient    Risks, benefits, and alternatives were discussed: yes      Risks discussed:  Infection, pain, poor cosmetic result and poor wound healing    Alternatives discussed:  No treatment  Universal protocol:     Patient identity confirmed:  Verbally with patient  Anesthesia:     Anesthesia method:  Local infiltration    Local anesthetic:  Lidocaine 1% WITH epi  Laceration details:     Location:  Leg    Leg location:  L lower leg    Length (cm):  10  Pre-procedure details:     Preparation:  Patient was prepped and draped in usual sterile fashion  Exploration:     Limited defect created (wound extended): no      Hemostasis achieved with:  Direct pressure    Imaging outcome: foreign body not noted      Contaminated: no    Treatment:     Area cleansed with:  Chlorhexidine and saline    Amount of cleaning:  Extensive    Irrigation solution:  Sterile saline    Irrigation method:  Syringe    Visualized foreign bodies/material removed: no      Debridement:  None    Undermining:  None  Skin repair:     Repair method:  Sutures    Suture size:  4-0    Suture material:  Nylon    Suture technique:  Horizontal mattress    Number of sutures:  8  Approximation:     Approximation:  Close  Repair type:     Repair type:  Complex  Post-procedure details:     Dressing:  Non-adherent dressing    Procedure completion:  Tolerated well, no immediate complications               Isac Carranza PA-C  04/21/25 0245

## 2025-04-21 NOTE — DISCHARGE INSTRUCTIONS
Please have stitches removed in 10 to 14 days.  You were referred to the wound care clinic to make sure the wound is healing.  Return to ER symptoms change or worsen

## 2025-07-19 ENCOUNTER — APPOINTMENT (OUTPATIENT)
Dept: RADIOLOGY | Facility: HOSPITAL | Age: 70
End: 2025-07-19
Payer: MEDICARE

## 2025-07-19 ENCOUNTER — APPOINTMENT (OUTPATIENT)
Dept: CARDIOLOGY | Facility: HOSPITAL | Age: 70
End: 2025-07-19
Payer: MEDICARE

## 2025-07-19 ENCOUNTER — HOSPITAL ENCOUNTER (EMERGENCY)
Facility: HOSPITAL | Age: 70
Discharge: HOME | End: 2025-07-19
Attending: STUDENT IN AN ORGANIZED HEALTH CARE EDUCATION/TRAINING PROGRAM
Payer: MEDICARE

## 2025-07-19 VITALS
DIASTOLIC BLOOD PRESSURE: 103 MMHG | OXYGEN SATURATION: 94 % | RESPIRATION RATE: 15 BRPM | BODY MASS INDEX: 35.89 KG/M2 | TEMPERATURE: 98.2 F | WEIGHT: 265 LBS | HEART RATE: 70 BPM | SYSTOLIC BLOOD PRESSURE: 132 MMHG | HEIGHT: 72 IN
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
## 2025-07-19 DIAGNOSIS — M54.50 ACUTE MIDLINE LOW BACK PAIN WITHOUT SCIATICA: ICD-10-CM

## 2025-07-19 DIAGNOSIS — S09.90XA HEAD INJURY, INITIAL ENCOUNTER: Primary | ICD-10-CM

## 2025-07-19 LAB
ALBUMIN SERPL BCP-MCNC: 3.2 G/DL (ref 3.4–5)
ALP SERPL-CCNC: 90 U/L (ref 33–136)
ALT SERPL W P-5'-P-CCNC: 12 U/L (ref 10–52)
ANION GAP SERPL CALC-SCNC: 11 MMOL/L (ref 10–20)
AST SERPL W P-5'-P-CCNC: 15 U/L (ref 9–39)
BASOPHILS # BLD AUTO: 0.01 X10*3/UL (ref 0–0.1)
BASOPHILS NFR BLD AUTO: 0.2 %
BILIRUB SERPL-MCNC: 0.4 MG/DL (ref 0–1.2)
BUN SERPL-MCNC: 31 MG/DL (ref 6–23)
CALCIUM SERPL-MCNC: 8.9 MG/DL (ref 8.6–10.3)
CHLORIDE SERPL-SCNC: 105 MMOL/L (ref 98–107)
CO2 SERPL-SCNC: 23 MMOL/L (ref 21–32)
CREAT SERPL-MCNC: 1.55 MG/DL (ref 0.5–1.3)
EGFRCR SERPLBLD CKD-EPI 2021: 48 ML/MIN/1.73M*2
EOSINOPHIL # BLD AUTO: 0.08 X10*3/UL (ref 0–0.7)
EOSINOPHIL NFR BLD AUTO: 1.5 %
ERYTHROCYTE [DISTWIDTH] IN BLOOD BY AUTOMATED COUNT: 14.2 % (ref 11.5–14.5)
GLUCOSE SERPL-MCNC: 108 MG/DL (ref 74–99)
HCT VFR BLD AUTO: 33.9 % (ref 41–52)
HGB BLD-MCNC: 10.4 G/DL (ref 13.5–17.5)
IMM GRANULOCYTES # BLD AUTO: 0.03 X10*3/UL (ref 0–0.7)
IMM GRANULOCYTES NFR BLD AUTO: 0.6 % (ref 0–0.9)
LYMPHOCYTES # BLD AUTO: 1.32 X10*3/UL (ref 1.2–4.8)
LYMPHOCYTES NFR BLD AUTO: 24.7 %
MAGNESIUM SERPL-MCNC: 2.18 MG/DL (ref 1.6–2.4)
MCH RBC QN AUTO: 27.7 PG (ref 26–34)
MCHC RBC AUTO-ENTMCNC: 30.7 G/DL (ref 32–36)
MCV RBC AUTO: 90 FL (ref 80–100)
MONOCYTES # BLD AUTO: 0.43 X10*3/UL (ref 0.1–1)
MONOCYTES NFR BLD AUTO: 8 %
NEUTROPHILS # BLD AUTO: 3.48 X10*3/UL (ref 1.2–7.7)
NEUTROPHILS NFR BLD AUTO: 65 %
NRBC BLD-RTO: 0 /100 WBCS (ref 0–0)
PLATELET # BLD AUTO: 177 X10*3/UL (ref 150–450)
POTASSIUM SERPL-SCNC: 4.7 MMOL/L (ref 3.5–5.3)
PROT SERPL-MCNC: 6.7 G/DL (ref 6.4–8.2)
RBC # BLD AUTO: 3.75 X10*6/UL (ref 4.5–5.9)
SODIUM SERPL-SCNC: 134 MMOL/L (ref 136–145)
WBC # BLD AUTO: 5.4 X10*3/UL (ref 4.4–11.3)

## 2025-07-19 PROCEDURE — 2500000004 HC RX 250 GENERAL PHARMACY W/ HCPCS (ALT 636 FOR OP/ED): Performed by: STUDENT IN AN ORGANIZED HEALTH CARE EDUCATION/TRAINING PROGRAM

## 2025-07-19 PROCEDURE — 85025 COMPLETE CBC W/AUTO DIFF WBC: CPT | Performed by: STUDENT IN AN ORGANIZED HEALTH CARE EDUCATION/TRAINING PROGRAM

## 2025-07-19 PROCEDURE — 71045 X-RAY EXAM CHEST 1 VIEW: CPT | Performed by: STUDENT IN AN ORGANIZED HEALTH CARE EDUCATION/TRAINING PROGRAM

## 2025-07-19 PROCEDURE — 83735 ASSAY OF MAGNESIUM: CPT | Performed by: STUDENT IN AN ORGANIZED HEALTH CARE EDUCATION/TRAINING PROGRAM

## 2025-07-19 PROCEDURE — 70450 CT HEAD/BRAIN W/O DYE: CPT | Performed by: RADIOLOGY

## 2025-07-19 PROCEDURE — 70450 CT HEAD/BRAIN W/O DYE: CPT

## 2025-07-19 PROCEDURE — 93005 ELECTROCARDIOGRAM TRACING: CPT

## 2025-07-19 PROCEDURE — 96361 HYDRATE IV INFUSION ADD-ON: CPT

## 2025-07-19 PROCEDURE — 72131 CT LUMBAR SPINE W/O DYE: CPT | Performed by: RADIOLOGY

## 2025-07-19 PROCEDURE — 71045 X-RAY EXAM CHEST 1 VIEW: CPT

## 2025-07-19 PROCEDURE — 96374 THER/PROPH/DIAG INJ IV PUSH: CPT

## 2025-07-19 PROCEDURE — 72131 CT LUMBAR SPINE W/O DYE: CPT

## 2025-07-19 PROCEDURE — 36415 COLL VENOUS BLD VENIPUNCTURE: CPT | Performed by: STUDENT IN AN ORGANIZED HEALTH CARE EDUCATION/TRAINING PROGRAM

## 2025-07-19 PROCEDURE — 72125 CT NECK SPINE W/O DYE: CPT

## 2025-07-19 PROCEDURE — 72125 CT NECK SPINE W/O DYE: CPT | Performed by: RADIOLOGY

## 2025-07-19 PROCEDURE — 99285 EMERGENCY DEPT VISIT HI MDM: CPT | Mod: 25 | Performed by: STUDENT IN AN ORGANIZED HEALTH CARE EDUCATION/TRAINING PROGRAM

## 2025-07-19 PROCEDURE — 80053 COMPREHEN METABOLIC PANEL: CPT | Performed by: STUDENT IN AN ORGANIZED HEALTH CARE EDUCATION/TRAINING PROGRAM

## 2025-07-19 RX ORDER — FENTANYL CITRATE 50 UG/ML
50 INJECTION, SOLUTION INTRAMUSCULAR; INTRAVENOUS ONCE
Status: COMPLETED | OUTPATIENT
Start: 2025-07-19 | End: 2025-07-19

## 2025-07-19 RX ORDER — LIDOCAINE 560 MG/1
1 PATCH PERCUTANEOUS; TOPICAL; TRANSDERMAL DAILY
Qty: 7 PATCH | Refills: 0 | Status: SHIPPED | OUTPATIENT
Start: 2025-07-19

## 2025-07-19 RX ADMIN — SODIUM CHLORIDE, SODIUM LACTATE, POTASSIUM CHLORIDE, AND CALCIUM CHLORIDE 1000 ML: .6; .31; .03; .02 INJECTION, SOLUTION INTRAVENOUS at 18:04

## 2025-07-19 RX ADMIN — FENTANYL CITRATE 50 MCG: 50 INJECTION INTRAMUSCULAR; INTRAVENOUS at 18:05

## 2025-07-19 ASSESSMENT — PAIN SCALES - GENERAL
PAINLEVEL_OUTOF10: 8
PAINLEVEL_OUTOF10: 8

## 2025-07-19 ASSESSMENT — LIFESTYLE VARIABLES
EVER FELT BAD OR GUILTY ABOUT YOUR DRINKING: NO
TOTAL SCORE: 0
HAVE PEOPLE ANNOYED YOU BY CRITICIZING YOUR DRINKING: NO
HAVE YOU EVER FELT YOU SHOULD CUT DOWN ON YOUR DRINKING: NO
EVER HAD A DRINK FIRST THING IN THE MORNING TO STEADY YOUR NERVES TO GET RID OF A HANGOVER: NO

## 2025-07-19 ASSESSMENT — PAIN - FUNCTIONAL ASSESSMENT
PAIN_FUNCTIONAL_ASSESSMENT: 0-10
PAIN_FUNCTIONAL_ASSESSMENT: 0-10

## 2025-07-19 ASSESSMENT — PAIN DESCRIPTION - DESCRIPTORS: DESCRIPTORS: ACHING

## 2025-07-19 ASSESSMENT — PAIN DESCRIPTION - LOCATION
LOCATION: COCCYX
LOCATION: COCCYX

## 2025-07-19 ASSESSMENT — PAIN DESCRIPTION - PAIN TYPE: TYPE: ACUTE PAIN

## 2025-07-19 NOTE — ED PROVIDER NOTES
EMERGENCY DEPARTMENT ENCOUNTER      Pt Name: Lawrence Hardwick  MRN: [id number]  Birthdate 1955  Date of evaluation: 7/19/2025  Provider: Benedict Tapia DO    CHIEF COMPLAINT       Chief Complaint   Patient presents with    Fall     Pt coming from nursing facility for a fall out of wheelchair. Per pt, he was taking a nap in his wheelchair when he woke up to himself falling forward out of it. Pt hit the curb with his head, has a small abrasion to right side of forehead. Pt also c/o coccyx pain as well. Pt denies blood thinners or LOC, A&Ox4 upon arrival.       HISTORY OF PRESENT ILLNESS    Lawrence Hardwick is a 69 y.o. male who presents to the emergency department with EMS due to mechanical fall from wheelchair.  Patient states that he was in his motorized scooter when he began to fell asleep subsequently tipping forward striking his head as well as buttocks on the ground.  He denies any loss of consciousness.  Admits that his tetanus vaccination is up-to-date.  Chief complaint at this time is lower lumbar pain.  He reports that he is currently in the nursing facility for rehab.  He denies any further associated symptoms at this time.  No associated neck or upper back pain.  Not on anticoagulation.          Nursing Notes were reviewed.    REVIEW OF SYSTEMS     CONSTITUTIONAL: Denies fever, sweats, chills.   NEURO: Denies numbness, weakness, tingling, headache.   HEENT: Denies sore throat, rhinorrhea, changes in vision.   CARDIO: Denies chest pain, palpitations.  PULM: Denies shortness of breath, cough.   GI: Denies abdominal pain, nausea, vomiting, diarrhea, constipation, melena, hematochezia.  : Denies painful urination, frequency, hematuria.   MSK: Endorses closed head injury.  SKIN: Endorses scattered abrasions.  ENDOCRINE: Denies unexpected weight-loss.   HEME: Denies bleeding disorder.     PAST MEDICAL HISTORY   Medical History[1]    SURGICAL HISTORY     Surgical History[2]    ALLERGIES     Patient has no  known allergies.    FAMILY HISTORY     Family History[3]     SOCIAL HISTORY     Social History[4]    PHYSICAL EXAM   VS: As documented in the triage note from today's date and EMR flowsheet were reviewed.  Gen: Obese. No acute distress. Seated in bed. Appears nontoxic.  GCS 15.  Skin: Warm. Dry.  Scattered abrasions to the right forehead. No rashes or lesions.  Eyes: Pupils equally round and reactive to light. Clear sclera. EOMI.  HENT: Atraumatic appearance with the exception as above. Mucosal membranes moist. No oral lesions, uvula midline, airway patent.  TMs clear bilaterally nares clear bilaterally no evidence of basilar skull fracture no midline cervical tenderness or step-offs trachea is midline.  CV: Regular rate and regular rhythm. S1, S2. No pedal edema. Warm extremities.  Resp: Nonlabored breathing Clear to auscultation bilaterally. No increased work of breathing.   GI: Soft and nontender. No rebound or guarding. Bowel sounds x4 present.   MSK: Symmetric muscle bulk. No joint swelling in the extremities. Compartments are soft. Neurovascularly intact x4 extremities. Radial pulses +2 equal bilaterally.  Pedal pulses left lower extremity +2.  Right lower extremity chronic amputation.  Extremities atraumatic.  No midline C or T-spine tenderness there is minimal midline lumbar spine tenderness no step-offs.  Pelvis is stable  Neuro: Alert. CN II - XII intact. Speech fluent. Moving all extremities. No focal deficits.  With the exception as above.  Psych: Appropriate. Kempt.    DIAGNOSTIC RESULTS   RADIOLOGY:   Non-plain film images such as CT, Ultrasound and MRI are read by the radiologist. Plain radiographic images are visualized and preliminarily interpreted by the emergency physician with the below findings: Chest x-ray no evidence of pneumothorax.      Interpretation per the Radiologist below, if available at the time of this note:    CT head wo IV contrast   Final Result   CT HEAD:   1. No acute  intracranial abnormality or calvarial fracture.             CT CERVICAL SPINE:   1. No acute fracture or traumatic malalignment of the cervical spine.        MACRO:   None        Signed by: Connor Garcia 7/19/2025 7:05 PM   Dictation workstation:   [id number]      CT cervical spine wo IV contrast   Final Result   CT HEAD:   1. No acute intracranial abnormality or calvarial fracture.             CT CERVICAL SPINE:   1. No acute fracture or traumatic malalignment of the cervical spine.        MACRO:   None        Signed by: Connor Garcia 7/19/2025 7:05 PM   Dictation workstation:   [id number]      CT lumbar spine wo IV contrast   Final Result   No acute fracture or traumatic malalignment of the lumbar spine.        Nonspecific bladder wall thickening, correlate for evidence of   cystitis.        Signed by: Connor Garcia 7/19/2025 7:08 PM   Dictation workstation:   [id number]      XR chest 1 view   Final Result   No acute cardiopulmonary process.             MACRO:   None.        Signed by: Ruben Holley 7/19/2025 6:52 PM   Dictation workstation:   [id number]            ED BEDSIDE ULTRASOUND:   Performed by ED Physician - none    LABS:  Labs Reviewed   CBC WITH AUTO DIFFERENTIAL - Abnormal       Result Value    WBC 5.4      nRBC 0.0      RBC 3.75 (*)     Hemoglobin 10.4 (*)     Hematocrit 33.9 (*)     MCV 90      MCH 27.7      MCHC 30.7 (*)     RDW 14.2      Platelets 177      Neutrophils % 65.0      Immature Granulocytes %, Automated 0.6      Lymphocytes % 24.7      Monocytes % 8.0      Eosinophils % 1.5      Basophils % 0.2      Neutrophils Absolute 3.48      Immature Granulocytes Absolute, Automated 0.03      Lymphocytes Absolute 1.32      Monocytes Absolute 0.43      Eosinophils Absolute 0.08      Basophils Absolute 0.01     COMPREHENSIVE METABOLIC PANEL - Abnormal    Glucose 108 (*)     Sodium 134 (*)     Potassium 4.7      Chloride 105      Bicarbonate 23      Anion Gap 11      Urea Nitrogen 31 (*)      Creatinine 1.55 (*)     eGFR 48 (*)     Calcium 8.9      Albumin 3.2 (*)     Alkaline Phosphatase 90      Total Protein 6.7      AST 15      Bilirubin, Total 0.4      ALT 12     MAGNESIUM - Normal    Magnesium 2.18         All other labs were within normal range or not returned as of this dictation.    EMERGENCY DEPARTMENT COURSE/MDM:   Vitals:    Vitals:    07/19/25 1730 07/19/25 1900 07/19/25 1930   BP: (!) 99/47 135/61 (!) 132/103   BP Location: Right arm Left arm Right arm   Patient Position: Lying Sitting Lying   Pulse: 66 65 70   Resp: 17 19 15   Temp: 36.8 °C (98.2 °F)     TempSrc: Temporal     SpO2: 95% 95% 94%   Weight: 120 kg (265 lb)     Height: 1.829 m (6')         I reviewed the patient's triage vitals and they are pressure labile therefore fluid bolus was ordered.    Due to the above findings the following was ordered basic labs include CT imaging of the head and neck lumbar spine.  Fentanyl for pain and fluid bolus.    Lab work does reveal mild anemia no active signs of bleeding blood pressure has stabilized recommend close outpatient follow-up.  No leukocytosis make infectious etiology less likely.  Patient's renal function is at baseline.  There were no significant lecture light derangements.  Chest x-ray is clear.  CT imaging shows incidental findings all which were thoroughly discussed with the patient recommend close outpatient follow-up.  No traumatic injuries noted.  Patient does have pain in the low back I do suspect this is likely musculoskeletal pain secondary to the fall.  No indication for antibiotic prophylaxis due to the abrasions.  He is recommended keeping them clean.  He is recommend Tylenol Motrin alternating as needed for mild to moderate pain lidocaine patches on 12 hours 12 hours off to the affected area.  He is appreciative of care given close follow-up and strict return precautions.    Patient has full strength in the upper and lower extremities no indication for additional  imaging of the spine.    ED Course as of 07/19/25 2101   Sat Jul 19, 2025   1810 Interpreted by the Emergency Department Attending: ECG revealed normal sinus rhythm at a rate of 63 beats per minute with MT interval 244 , QRS of 100 , QTc of 405.  No acute injury pattern. Previous EKG on May 27 revealed nonspecific changes.    [MG]      ED Course User Index  [MG] Benedict Tapia DO         Diagnoses as of 07/19/25 2101   Head injury, initial encounter   Fall, initial encounter   Acute midline low back pain without sciatica       Patient was counseled regarding labs, imaging, likely diagnosis, and plan. All questions were answered.     ------------------------------------------------------------------  Information provided by the patient and EMS  Due to social and economic determinants resides at rehab facility  Past medical history complicating workup CHF, chronic right lower extremity amputation  Previous medical records reviewed hospital admission 4/27/2023  Considered CT imaging of the abdomen pelvis although no indication no flank or abdominal pain.  Shared medical decision making patient agreeable with home-going lidocaine patches strict return precautions.  ------------------------------------------------------------------  ED Medications administered this visit:    Medications   fentaNYL PF (Sublimaze) injection 50 mcg (50 mcg intravenous Given 7/19/25 1805)   lactated Ringer's bolus 1,000 mL (1,000 mL intravenous New Bag 7/19/25 1804)       New Prescriptions from this visit:    New Prescriptions    LIDOCAINE 4 % PATCH    Place 1 patch over 12 hours on the skin once daily. Remove & discard patch within 12 hours or as directed by MD.       Follow-up:  Jorge Pickett MD  [address] [facility]  [address]  [place] [place] [address]  [phone]    Schedule an appointment as soon as possible for a visit in 2 days      [facility]  [address] [facility] [address] [phone]  Go to   If  symptoms worsen        Final Impression:   1. Head injury, initial encounter    2. Fall, initial encounter    3. Acute midline low back pain without sciatica          Benedict Tapia DO    (Please note that portions of this note were completed with a voice recognition program.  Efforts were made to edit the dictations but occasionally words are mis-transcribed.)         [1]   Past Medical History:  Diagnosis Date    Other specified health status     No pertinent past medical history   [2]   Past Surgical History:  Procedure Laterality Date    OTHER SURGICAL HISTORY  07/07/2020    Lower extremity amputation below knee   [3] No family history on file.  [4]   Social History  Socioeconomic History    Marital status:         Benedict Tapia DO  07/19/25 8987

## 2025-07-19 NOTE — ED NOTES
Pt coming from nursing facility for a fall out of wheelchair. Per pt, he was taking a nap in his wheelchair when he woke up to himself falling forward out of it. Pt hit the curb with his head, has a small abrasion to right side of forehead. Pt also c/o coccyx pain as well. Pt denies blood thinners or LOC, A&Ox4 upon arrival.     Christiana Luke RN  07/19/25 3065

## 2025-07-19 NOTE — DISCHARGE INSTRUCTIONS
You diagnosed with a mechanical fall no evidence of fracture or dislocation I do recommend Tylenol Motrin alternating as needed for mild to moderate pain lidocaine patches 12 hours on 12 hours off.  Please follow-up with your primary provider in the coming days for repeat evaluation.  Treatment experiencing numbness tingling pain out of proportion symptoms concerning to call 911 or return to the nearest emergency department immediately.

## 2025-07-26 LAB
ATRIAL RATE: 63 BPM
P AXIS: 7 DEGREES
PR INTERVAL: 244 MS
Q ONSET: 249 MS
QRS COUNT: 10 BEATS
QRS DURATION: 100 MS
QT INTERVAL: 395 MS
QTC CALCULATION(BAZETT): 405 MS
QTC FREDERICIA: 401 MS
R AXIS: 2 DEGREES
T AXIS: 2 DEGREES
T OFFSET: 447 MS
VENTRICULAR RATE: 63 BPM

## 2025-08-01 ENCOUNTER — LAB REQUISITION (OUTPATIENT)
Dept: LAB | Facility: HOSPITAL | Age: 70
End: 2025-08-01
Payer: MEDICARE